# Patient Record
Sex: MALE | Race: WHITE | Employment: FULL TIME | URBAN - METROPOLITAN AREA
[De-identification: names, ages, dates, MRNs, and addresses within clinical notes are randomized per-mention and may not be internally consistent; named-entity substitution may affect disease eponyms.]

---

## 2018-10-15 ENCOUNTER — OFFICE VISIT (OUTPATIENT)
Dept: SURGERY | Age: 53
End: 2018-10-15

## 2018-10-15 VITALS
HEART RATE: 80 BPM | OXYGEN SATURATION: 98 % | TEMPERATURE: 98 F | RESPIRATION RATE: 16 BRPM | SYSTOLIC BLOOD PRESSURE: 118 MMHG | DIASTOLIC BLOOD PRESSURE: 78 MMHG | HEIGHT: 70 IN | BODY MASS INDEX: 32.21 KG/M2 | WEIGHT: 225 LBS

## 2018-10-15 DIAGNOSIS — L72.3 SEBACEOUS CYST: Primary | ICD-10-CM

## 2018-10-15 PROBLEM — I10 ESSENTIAL HYPERTENSION: Status: ACTIVE | Noted: 2018-10-15

## 2018-10-15 PROBLEM — I48.91 ATRIAL FIBRILLATION (HCC): Status: ACTIVE | Noted: 2018-10-15

## 2018-10-15 PROBLEM — E66.9 CLASS 1 OBESITY IN ADULT: Status: ACTIVE | Noted: 2018-10-15

## 2018-10-15 RX ORDER — ASPIRIN 81 MG/1
TABLET ORAL DAILY
COMMUNITY

## 2018-10-15 RX ORDER — TRIAMTERENE/HYDROCHLOROTHIAZID 37.5-25 MG
TABLET ORAL
Refills: 1 | COMMUNITY
Start: 2018-07-11

## 2018-10-15 RX ORDER — LOSARTAN POTASSIUM 50 MG/1
TABLET ORAL 2 TIMES DAILY
COMMUNITY

## 2018-10-15 RX ORDER — GEMFIBROZIL 600 MG/1
600 TABLET, FILM COATED ORAL 2 TIMES DAILY
COMMUNITY

## 2018-10-15 RX ORDER — ESOMEPRAZOLE MAGNESIUM 40 MG/1
CAPSULE, DELAYED RELEASE ORAL DAILY
COMMUNITY

## 2018-10-15 NOTE — PROGRESS NOTES
HISTORY OF PRESENT ILLNESS  Benjie Noble is a 52 y.o. male who is referred by Dr. Ha Monge for further evaluation of a sebaceous cyst on his back. HPI Comments: Mr. Louise Sharma tells me that he has had a subcutaneous mass on his back for approximately thirty years now. The mass has become progressively larger and more bothersome to him. No associated drainage. Found to have a sebaceous cyst.   He has otherwise been in his usual state of health. Past Medical History:  No date: Acid indigestion  10/15/2018: Atrial fibrillation ([address]  10/15/2018: Class 1 obesity in adult  No date: Dizziness  10/15/2018: Essential hypertension  No date: Fast heart beat  No date: Heart disease  No date: Hypertension  10/15/2018: Sebaceous cyst    History reviewed. No pertinent surgical history. Review of patient's family history indicates:    Diabetes                       Mother                    Heart Disease                  Mother                    Hypertension                   Mother                    Hypertension                   Father                    Heart Disease                  Father                    Social History: Employment - [employer]. Tobacco - Denies. EtOH - Denies. Review of systems negative except as noted. Review of Systems   Gastrointestinal: Positive for heartburn. Musculoskeletal:        Discomfort at site of sebaceous cyst.   Neurological: Positive for dizziness. Physical Exam   Constitutional: He appears well-developed and well-nourished. No distress. HENT:   Head: Normocephalic and atraumatic. Eyes: No scleral icterus. Neck: Neck supple. Cardiovascular: Normal rate and regular rhythm. Pulmonary/Chest: Effort normal and breath sounds normal.   Abdominal: Soft. He exhibits no distension. There is no tenderness. There is no rebound and no guarding. Musculoskeletal: Normal range of motion. Lymphadenopathy:     He has no cervical adenopathy. Neurological: He is alert. Skin:        Approx. 4cm x 4cm, well circumscribed, freely movable, subcutaneous mass. Clinically, this is c/w a sebaceous cyst.   Vitals reviewed. ASSESSMENT and PLAN  In view of the findings on H and P, Mr. Clarke Rodriugez should benefit from excision of the sebaceous cyst as it is bothersome to him. Discussed procedure with him including risks of bleeding, infection, recurrence. He understands and wishes to proceed. I have tentatively scheduled Mr. Clarke Rodriguez for surgery on November 1, 2018 at [facility] and will see him back in the office postoperatively. He is agreeable to this plan of action and is most certainly free to contact the office should any questions or concerns arise.          CC: Addison Charles MD

## 2018-10-15 NOTE — MR AVS SNAPSHOT
[address] [place] [address] 
[phone] Patient: Bradford Khoury MRN: [id number] :1965 Visit Information Date & Time Provider Department Dept. Phone Encounter #  
 10/15/2018 10:40 AM Silvia Augustine MD [address] [id number] Upcoming Health Maintenance Date Due Hepatitis C Screening 1965 DTaP/Tdap/Td series (1 - Tdap) 5/10/1984 Shingrix Vaccine Age 50> (1 of 2) 5/10/2013 FOBT Q 1 YEAR AGE 50-75 5/10/2013 Influenza Age 5 to Adult 2018 Allergies as of 10/15/2018  Review Complete On: 10/15/2018 By: Silvia Augustine MD  
  
 Severity Noted Reaction Type Reactions Amoxicillin  10/15/2018    Unknown (comments) Augmentin [Amoxicillin-pot Clavulanate]  10/15/2018    Unknown (comments) Current Immunizations  Never Reviewed No immunizations on file. Not reviewed this visit You Were Diagnosed With   
  
 Codes Comments Sebaceous cyst    -  Primary ICD-10-CM: L72.3 ICD-9-CM: 706. 2 Vitals BP Pulse Temp Resp Height(growth percentile) Weight(growth percentile) 118/78 (BP 1 Location: Left arm, BP Patient Position: Sitting) 80 98 °F (36.7 °C) (Oral) 16 5' 10\" (1.778 m) 225 lb (102.1 kg) SpO2 BMI Smoking Status 98% 32.28 kg/m2 Never Smoker BMI and BSA Data Body Mass Index Body Surface Area  
 32.28 kg/m 2 2.25 m 2 Your Updated Medication List  
  
   
This list is accurate as of 10/15/18 11:00 AM.  Always use your most recent med list.  
  
  
  
  
 aspirin delayed-release 81 mg tablet Take  by mouth daily. esomeprazole 40 mg capsule Commonly known as:  Pérez Sorrel Take  by mouth daily. gemfibrozil 600 mg tablet Commonly known as:  LOPID Take 600 mg by mouth two (2) times a day. losartan 50 mg tablet Commonly known as:  COZAAR Take  by mouth daily. triamterene-hydroCHLOROthiazide 37.5-25 mg per tablet Commonly known as:  Haylee Aguilar TAKE 1 TABLET BY MOUTH DAILY Introducing [facility]! [facility] introduces [employer] patient portal. Now you can access parts of your medical record, email your doctor's office, and request medication refills online. 1. In your internet browser, go to [email or web address] 2. Click on the First Time User? Click Here link in the Sign In box. You will see the New Member Sign Up page. 3. Enter your [employer] Access Code exactly as it appears below. You will not need to use this code after youve completed the sign-up process. If you do not sign up before the expiration date, you must request a new code. · [employer] Access Code: [email or web address] Expires: 1/13/2019 11:00 AM 
 
4. Enter the last four digits of your Social Security Number (xxxx) and Date of Birth (mm/dd/yyyy) as indicated and click Submit. You will be taken to the next sign-up page. 5. Create a [employer] ID. This will be your [employer] login ID and cannot be changed, so think of one that is secure and easy to remember. 6. Create a [employer] password. You can change your password at any time. 7. Enter your Password Reset Question and Answer. This can be used at a later time if you forget your password. 8. Enter your e-mail address. You will receive e-mail notification when new information is available in [address] 9. Click Sign Up. You can now view and download portions of your medical record. 10. Click the Download Summary menu link to download a portable copy of your medical information. If you have questions, please visit the Frequently Asked Questions section of the [employer] website. Remember, [employer] is NOT to be used for urgent needs. For medical emergencies, dial 911. Now available from your iPhone and Android! Please provide this summary of care documentation to your next provider. If you have any questions after today's visit, please call [phone].

## 2018-10-15 NOTE — LETTER
10/15/2018 1:54 PM 
 
Mr. Dianna Girard 
[address] [address] [facility] [address] Surgery is scheduled as follows: 
 
Surgery date: Thursday, 11/01/2018 Location: [facility], [address] Arrival time: 12:00 PM     Start time: 1:00 PM 
 
DO NOT EAT OR DRINK ANYTHING PAST MIDNIGHT THE NIGHT BEFORE SURGERY 
______________________________________________________________________ Pre-Registration: Date: Wednesday, 10/17/2018     Time: 9:00 AM 
Location: [facility], [address] PLEASE ARRIVE 15-20 MINUTES EARLY FOR REGISTRATION PAPERWORK. The nurses will review your medications with you at this time and also obtain the pre-testing that the doctor has ordered. Please allow approximately 1.5 hours for this appointment. 1st Post Operative appointment:  
 
Monday, 11/19/2018 at 11:00 AM with YUDITH Padilla [address] [address] Office Phone: [phone] For questions regarding this information please contact Kelle Ervin at [phone]. Sincerely, eKlle Ervin Surgical Scheduler Pre-Operative Instructions **DO NOT EAT or DRINK ANYTHING AFTER MIDNIGHT THE NIGHT BEFORE YOUR SURGERY** 
Please report to Patient Registration/Admitting at the time given to you by your Surgeons office  Located at the [address] [place] [address] single family member may accompany you to the pre-operative waiting area until you are called to be prepped for surgery.  Family members will be escorted to a waiting room while you are in surgery. If  your physical condition changes (fever, cold, flu), please contact your Surgeon as soon as possible. DO BRING your Insurance card and a photo ID, such as a Drivers License. Valuables are to be left at home. DO NOT wear make-up, lotion, powder, perfume/cologne/aftershave, or nail polish (unless clear). You may wear deodorant. DO NOT wear metal objects such as jewelry or hair pins. Remove all piercings/body jewelry. WEAR COMFORTABLE CLOTHING THAT WILL BE EASY TO REMOVE. If you are staying overnight, please pack a bag and leave it in your vehicle until after surgery. We recommend that you pack your toiletry items, a robe/pajamas, slippers or any other items that you need for your comfort. Have a family member deliver your bag to your room after your surgery  the Hospital does not a secure location to store these personal items. You may bring a case for glasses, contact lenses, or hearing aids. Denture cups are provided by the [address] [place] [address] OR AFTER YOUR SURGERY. YOU SHOULD NOT OPERATE A VEHICLE FOR 24 HOURS AFTER RECEIVING SEDATION OR ANESTHESIA. Please arrange for a family member or friend to drive you home after your surgery: You will not be allowed to take a cab or drive yourself home. If you are discharged the day of surgery, it is recommended that you have someone stay with you until the next morning. For questions regarding the above information, please contact the Irina Domino  office at [phone].

## 2018-10-15 NOTE — PROGRESS NOTES
1. Have you been to the ER, urgent care clinic since your last visit? Hospitalized since your last visit? No    2. Have you seen or consulted any other health care providers outside of the [address] [place] [address] since your last visit? Include any pap smears or colon screening.  No

## 2018-10-17 ENCOUNTER — HOSPITAL ENCOUNTER (OUTPATIENT)
Dept: PREADMISSION TESTING | Age: 53
Discharge: HOME | End: 2018-10-17
Payer: COMMERCIAL

## 2018-10-17 VITALS
OXYGEN SATURATION: 100 % | TEMPERATURE: 97.9 F | HEIGHT: 70 IN | WEIGHT: 226 LBS | DIASTOLIC BLOOD PRESSURE: 74 MMHG | BODY MASS INDEX: 32.35 KG/M2 | SYSTOLIC BLOOD PRESSURE: 115 MMHG | RESPIRATION RATE: 16 BRPM | HEART RATE: 66 BPM

## 2018-10-17 LAB
ERYTHROCYTE [DISTWIDTH] IN BLOOD BY AUTOMATED COUNT: 11.8 % (ref 11.5–14.5)
HCT VFR BLD AUTO: 43.7 % (ref 36.6–50.3)
HGB BLD-MCNC: 15.7 G/DL (ref 12.1–17)
MCH RBC QN AUTO: 31.8 PG (ref 26–34)
MCHC RBC AUTO-ENTMCNC: 35.9 G/DL (ref 30–36.5)
MCV RBC AUTO: 88.6 FL (ref 80–99)
NRBC # BLD: 0 K/UL (ref 0–0.01)
NRBC BLD-RTO: 0 PER 100 WBC
PLATELET # BLD AUTO: 224 K/UL (ref 150–400)
PMV BLD AUTO: 10.3 FL (ref 8.9–12.9)
RBC # BLD AUTO: 4.93 M/UL (ref 4.1–5.7)
WBC # BLD AUTO: 3.5 K/UL (ref 4.1–11.1)

## 2018-10-17 PROCEDURE — 85027 COMPLETE CBC AUTOMATED: CPT | Performed by: SURGERY

## 2018-10-17 PROCEDURE — 36415 COLL VENOUS BLD VENIPUNCTURE: CPT | Performed by: SURGERY

## 2018-10-17 RX ORDER — BUPIVACAINE HYDROCHLORIDE 2.5 MG/ML
30 INJECTION, SOLUTION EPIDURAL; INFILTRATION; INTRACAUDAL ONCE
Status: CANCELLED | OUTPATIENT
Start: 2018-10-17 | End: 2018-10-17

## 2018-10-17 NOTE — PERIOP NOTES
Connect care down during initial interview and patient verbally given time of arrival, NPO instructions, antibacterial soap instructions, and ride requirement, patient verbalized understanding and no concerned voiced

## 2018-10-26 ENCOUNTER — ANESTHESIA EVENT (OUTPATIENT)
Dept: SURGERY | Age: 53
End: 2018-10-26
Payer: COMMERCIAL

## 2018-11-01 ENCOUNTER — HOSPITAL ENCOUNTER (OUTPATIENT)
Age: 53
Setting detail: OUTPATIENT SURGERY
Discharge: HOME | End: 2018-11-01
Attending: SURGERY | Admitting: SURGERY
Payer: COMMERCIAL

## 2018-11-01 ENCOUNTER — ANESTHESIA (OUTPATIENT)
Dept: SURGERY | Age: 53
End: 2018-11-01
Payer: COMMERCIAL

## 2018-11-01 VITALS
DIASTOLIC BLOOD PRESSURE: 73 MMHG | HEART RATE: 86 BPM | RESPIRATION RATE: 17 BRPM | SYSTOLIC BLOOD PRESSURE: 116 MMHG | HEIGHT: 70 IN | OXYGEN SATURATION: 97 % | BODY MASS INDEX: 32.35 KG/M2 | WEIGHT: 225.97 LBS | TEMPERATURE: 98 F

## 2018-11-01 DIAGNOSIS — L72.3 SEBACEOUS CYST: Primary | ICD-10-CM

## 2018-11-01 PROCEDURE — 77030039266 HC ADH SKN EXOFIN S2SG -A: Performed by: SURGERY

## 2018-11-01 PROCEDURE — 77030018836 HC SOL IRR NACL ICUM -A: Performed by: SURGERY

## 2018-11-01 PROCEDURE — 74011000250 HC RX REV CODE- 250

## 2018-11-01 PROCEDURE — 76210000063 HC OR PH I REC FIRST 0.5 HR: Performed by: SURGERY

## 2018-11-01 PROCEDURE — 74011250636 HC RX REV CODE- 250/636

## 2018-11-01 PROCEDURE — 76060000033 HC ANESTHESIA 1 TO 1.5 HR: Performed by: SURGERY

## 2018-11-01 PROCEDURE — 77030011640 HC PAD GRND REM COVD -A: Performed by: SURGERY

## 2018-11-01 PROCEDURE — 76210000020 HC REC RM PH II FIRST 0.5 HR: Performed by: SURGERY

## 2018-11-01 PROCEDURE — 74011000250 HC RX REV CODE- 250: Performed by: SURGERY

## 2018-11-01 PROCEDURE — 88304 TISSUE EXAM BY PATHOLOGIST: CPT | Performed by: SURGERY

## 2018-11-01 PROCEDURE — 77030026438 HC STYL ET INTUB CARD -A: Performed by: ANESTHESIOLOGY

## 2018-11-01 PROCEDURE — 74011250636 HC RX REV CODE- 250/636: Performed by: SURGERY

## 2018-11-01 PROCEDURE — 76010000149 HC OR TIME 1 TO 1.5 HR: Performed by: SURGERY

## 2018-11-01 PROCEDURE — 74011250636 HC RX REV CODE- 250/636: Performed by: ANESTHESIOLOGY

## 2018-11-01 PROCEDURE — 77030031139 HC SUT VCRL2 J&J -A: Performed by: SURGERY

## 2018-11-01 PROCEDURE — 77030002933 HC SUT MCRYL J&J -A: Performed by: SURGERY

## 2018-11-01 PROCEDURE — 77030008684 HC TU ET CUF COVD -B: Performed by: ANESTHESIOLOGY

## 2018-11-01 PROCEDURE — 77030014008 HC SPNG HEMSTAT J&J -C: Performed by: SURGERY

## 2018-11-01 RX ORDER — FENTANYL CITRATE 50 UG/ML
INJECTION, SOLUTION INTRAMUSCULAR; INTRAVENOUS AS NEEDED
Status: DISCONTINUED | OUTPATIENT
Start: 2018-11-01 | End: 2018-11-01 | Stop reason: HOSPADM

## 2018-11-01 RX ORDER — BUPIVACAINE HYDROCHLORIDE 2.5 MG/ML
30 INJECTION, SOLUTION EPIDURAL; INFILTRATION; INTRACAUDAL ONCE
Status: COMPLETED | OUTPATIENT
Start: 2018-11-01 | End: 2018-11-01

## 2018-11-01 RX ORDER — PROPOFOL 10 MG/ML
INJECTION, EMULSION INTRAVENOUS AS NEEDED
Status: DISCONTINUED | OUTPATIENT
Start: 2018-11-01 | End: 2018-11-01 | Stop reason: HOSPADM

## 2018-11-01 RX ORDER — ONDANSETRON 2 MG/ML
INJECTION INTRAMUSCULAR; INTRAVENOUS AS NEEDED
Status: DISCONTINUED | OUTPATIENT
Start: 2018-11-01 | End: 2018-11-01 | Stop reason: HOSPADM

## 2018-11-01 RX ORDER — HYDROCODONE BITARTRATE AND ACETAMINOPHEN 5; 325 MG/1; MG/1
1 TABLET ORAL
Qty: 8 TAB | Refills: 0 | Status: SHIPPED | OUTPATIENT
Start: 2018-11-01 | End: 2021-06-29 | Stop reason: ALTCHOICE

## 2018-11-01 RX ORDER — MIDAZOLAM HYDROCHLORIDE 1 MG/ML
INJECTION, SOLUTION INTRAMUSCULAR; INTRAVENOUS AS NEEDED
Status: DISCONTINUED | OUTPATIENT
Start: 2018-11-01 | End: 2018-11-01 | Stop reason: HOSPADM

## 2018-11-01 RX ORDER — SUCCINYLCHOLINE CHLORIDE 20 MG/ML
INJECTION INTRAMUSCULAR; INTRAVENOUS AS NEEDED
Status: DISCONTINUED | OUTPATIENT
Start: 2018-11-01 | End: 2018-11-01 | Stop reason: HOSPADM

## 2018-11-01 RX ORDER — SODIUM CHLORIDE, SODIUM LACTATE, POTASSIUM CHLORIDE, CALCIUM CHLORIDE 600; 310; 30; 20 MG/100ML; MG/100ML; MG/100ML; MG/100ML
50 INJECTION, SOLUTION INTRAVENOUS CONTINUOUS
Status: DISCONTINUED | OUTPATIENT
Start: 2018-11-01 | End: 2018-11-01 | Stop reason: HOSPADM

## 2018-11-01 RX ORDER — SODIUM CHLORIDE 9 MG/ML
50 INJECTION, SOLUTION INTRAVENOUS CONTINUOUS
Status: DISCONTINUED | OUTPATIENT
Start: 2018-11-01 | End: 2018-11-01 | Stop reason: HOSPADM

## 2018-11-01 RX ORDER — LIDOCAINE HYDROCHLORIDE 20 MG/ML
INJECTION, SOLUTION EPIDURAL; INFILTRATION; INTRACAUDAL; PERINEURAL AS NEEDED
Status: DISCONTINUED | OUTPATIENT
Start: 2018-11-01 | End: 2018-11-01 | Stop reason: HOSPADM

## 2018-11-01 RX ORDER — SODIUM CHLORIDE 0.9 % (FLUSH) 0.9 %
5-10 SYRINGE (ML) INJECTION AS NEEDED
Status: DISCONTINUED | OUTPATIENT
Start: 2018-11-01 | End: 2018-11-01 | Stop reason: HOSPADM

## 2018-11-01 RX ORDER — SODIUM CHLORIDE 0.9 % (FLUSH) 0.9 %
5-10 SYRINGE (ML) INJECTION EVERY 8 HOURS
Status: DISCONTINUED | OUTPATIENT
Start: 2018-11-01 | End: 2018-11-01 | Stop reason: HOSPADM

## 2018-11-01 RX ORDER — KETOROLAC TROMETHAMINE 30 MG/ML
INJECTION, SOLUTION INTRAMUSCULAR; INTRAVENOUS AS NEEDED
Status: DISCONTINUED | OUTPATIENT
Start: 2018-11-01 | End: 2018-11-01 | Stop reason: HOSPADM

## 2018-11-01 RX ORDER — LIDOCAINE HYDROCHLORIDE 10 MG/ML
0.1 INJECTION, SOLUTION EPIDURAL; INFILTRATION; INTRACAUDAL; PERINEURAL AS NEEDED
Status: DISCONTINUED | OUTPATIENT
Start: 2018-11-01 | End: 2018-11-01 | Stop reason: HOSPADM

## 2018-11-01 RX ORDER — VANCOMYCIN HYDROCHLORIDE 1 G/20ML
INJECTION, POWDER, LYOPHILIZED, FOR SOLUTION INTRAVENOUS
Status: DISCONTINUED
Start: 2018-11-01 | End: 2018-11-01 | Stop reason: HOSPADM

## 2018-11-01 RX ORDER — FENTANYL CITRATE 50 UG/ML
25 INJECTION, SOLUTION INTRAMUSCULAR; INTRAVENOUS
Status: DISCONTINUED | OUTPATIENT
Start: 2018-11-01 | End: 2018-11-01 | Stop reason: HOSPADM

## 2018-11-01 RX ORDER — HYDROMORPHONE HYDROCHLORIDE 1 MG/ML
0.5 INJECTION, SOLUTION INTRAMUSCULAR; INTRAVENOUS; SUBCUTANEOUS
Status: DISCONTINUED | OUTPATIENT
Start: 2018-11-01 | End: 2018-11-01 | Stop reason: HOSPADM

## 2018-11-01 RX ORDER — GLYCOPYRROLATE 0.2 MG/ML
INJECTION INTRAMUSCULAR; INTRAVENOUS AS NEEDED
Status: DISCONTINUED | OUTPATIENT
Start: 2018-11-01 | End: 2018-11-01 | Stop reason: HOSPADM

## 2018-11-01 RX ORDER — DEXAMETHASONE SODIUM PHOSPHATE 4 MG/ML
INJECTION, SOLUTION INTRA-ARTICULAR; INTRALESIONAL; INTRAMUSCULAR; INTRAVENOUS; SOFT TISSUE AS NEEDED
Status: DISCONTINUED | OUTPATIENT
Start: 2018-11-01 | End: 2018-11-01 | Stop reason: HOSPADM

## 2018-11-01 RX ORDER — SODIUM CHLORIDE 900 MG/100ML
INJECTION INTRAVENOUS
Status: DISCONTINUED
Start: 2018-11-01 | End: 2018-11-01 | Stop reason: HOSPADM

## 2018-11-01 RX ADMIN — PROPOFOL 200 MG: 10 INJECTION, EMULSION INTRAVENOUS at 12:36

## 2018-11-01 RX ADMIN — VANCOMYCIN HYDROCHLORIDE 1000 MG: 1 INJECTION, POWDER, LYOPHILIZED, FOR SOLUTION INTRAVENOUS at 12:07

## 2018-11-01 RX ADMIN — KETOROLAC TROMETHAMINE 30 MG: 30 INJECTION, SOLUTION INTRAMUSCULAR; INTRAVENOUS at 13:54

## 2018-11-01 RX ADMIN — LIDOCAINE HYDROCHLORIDE 100 MG: 20 INJECTION, SOLUTION EPIDURAL; INFILTRATION; INTRACAUDAL; PERINEURAL at 12:36

## 2018-11-01 RX ADMIN — SODIUM CHLORIDE, POTASSIUM CHLORIDE, SODIUM LACTATE AND CALCIUM CHLORIDE: 600; 310; 30; 20 INJECTION, SOLUTION INTRAVENOUS at 11:52

## 2018-11-01 RX ADMIN — DEXAMETHASONE SODIUM PHOSPHATE 4 MG: 4 INJECTION, SOLUTION INTRA-ARTICULAR; INTRALESIONAL; INTRAMUSCULAR; INTRAVENOUS; SOFT TISSUE at 12:59

## 2018-11-01 RX ADMIN — GLYCOPYRROLATE 0.2 MG: 0.2 INJECTION INTRAMUSCULAR; INTRAVENOUS at 12:23

## 2018-11-01 RX ADMIN — SUCCINYLCHOLINE CHLORIDE 140 MG: 20 INJECTION INTRAMUSCULAR; INTRAVENOUS at 12:36

## 2018-11-01 RX ADMIN — ONDANSETRON 4 MG: 2 INJECTION INTRAMUSCULAR; INTRAVENOUS at 12:59

## 2018-11-01 RX ADMIN — FENTANYL CITRATE 100 MCG: 50 INJECTION, SOLUTION INTRAMUSCULAR; INTRAVENOUS at 12:36

## 2018-11-01 RX ADMIN — MIDAZOLAM HYDROCHLORIDE 2 MG: 1 INJECTION, SOLUTION INTRAMUSCULAR; INTRAVENOUS at 12:27

## 2018-11-01 NOTE — PERIOP NOTES
Patient: Yany Osborn MRN: [id number]  SSN: [id number]   YOB: 1965  Age: 52 y.o. Sex: male     Patient is status post Procedure(s) with comments:  EXCISION SEBACEOUS CYST ON BACK - Redness noted preoperatively with small amount of pus visible.     Surgeon(s) and Role:     Prashant Aj MD - Primary    Local/Dose/Irrigation:  See chart                  Peripheral IV 11/01/18 Posterior;Right Hand (Active)   Site Assessment Clean, dry, & intact 11/1/2018 12:09 PM   Phlebitis Assessment 0 11/1/2018 12:09 PM   Infiltration Assessment 0 11/1/2018 12:09 PM   Dressing Status Clean, dry, & intact 11/1/2018 12:09 PM   Dressing Type Transparent 11/1/2018 12:09 PM   Hub Color/Line Status Blue 11/1/2018 12:09 PM                           Dressing/Packing:  Wound Back Upper;Medial-DRESSING TYPE: Topical skin adhesive/glue;4 x 4;Special tape (comment)(Medipore Tape) (11/01/18 1340)  Splint/Cast:  ]    Other:  Report given by Helder Mora and Dr Pete Irby

## 2018-11-01 NOTE — ANESTHESIA PREPROCEDURE EVALUATION
Anesthetic History No history of anesthetic complications Review of Systems / Medical History Patient summary reviewed and pertinent labs reviewed Pulmonary Within defined limits Neuro/Psych Within defined limits Cardiovascular Hypertension Dysrhythmias : atrial fibrillation Exercise tolerance: >4 METS 
  
GI/Hepatic/Renal 
Within defined limits Endo/Other Morbid obesity Other Findings Physical Exam 
 
Airway Mallampati: I 
TM Distance: 4 - 6 cm Neck ROM: normal range of motion Mouth opening: Normal 
 
 Cardiovascular Rhythm: regular Rate: normal 
 
 
 
 Dental 
 
Dentition: Upper dentition intact and Lower dentition intact Pulmonary Breath sounds clear to auscultation Abdominal 
GI exam deferred Other Findings Anesthetic Plan ASA: 3 Anesthesia type: general 
 
 
 
 
Induction: Intravenous Anesthetic plan and risks discussed with: Patient

## 2018-11-01 NOTE — DISCHARGE INSTRUCTIONS
Patient Discharge Instructions    Vinod Garcia / [id number] : 1965    Admitted 2018 Discharged: 2018       · It is important that you take the medication exactly as they are prescribed. · Keep your medication in the bottles provided by the pharmacist and keep a list of the medication names, dosages, and times to be taken in your wallet. · Do not take other medications without consulting your doctor. What to do at Home    Recommended diet: Regular. Recommended activity: No Restrictions. No Driving While Taking [address]. May Take Shower or [place] Roxo after removing dressing. Can remove dressing on November 3, 2018. If you experience any of the following symptoms Fevers, Chills, Nausea, Vomitting, Redness or Drainage at Surgical Site(s) or Any Other Questions or Concerns Please Call -  [phone]. Follow-up with Dr. Stephany Canales in 10-14 days. Information obtained by :  I understand that if any problems occur once I am at home I am to contact my physician. I understand and acknowledge receipt of the instructions indicated above. Physician's or R.N.'s Signature                                                                  Date/Time                                                                                                                                              Patient or Representative Signature                                                          Date/Time     Hydrocodone/Acetaminophen (Vicodin, Norco, Lortab) - (By mouth)   Why this medicine is used:   Treats pain.   Contact a nurse or doctor right away if you have:  · Blistering, peeling, red skin rash  · Fast or slow heartbeat, shallow breathing, blue lips, fingernails, or skin  · Anxiety, restlessness, muscle spasms, twitching, seeing or hearing things that are not there  · Dark urine or pale stools, yellow skin or eyes  · Extreme weakness, sweating, seizures, cold or clammy skin  · Lightheadedness, dizziness, fainting, fever, sweating     Common side effects:  · Constipation, nausea, vomiting, loss of appetite, stomach pain  · Tiredness or sleepiness  © 2017 [address] Information is for End User's use only and may not be sold, redistributed or otherwise used for commercial purposes. How to Care for Your Wound After Its Treated With  DERMABOND* Topical Skin Adhesive  DERMABOND* Topical Skin Adhesive (2-octyl cyanoacrylate) is a sterile, liquid skin adhesive  that holds wound edges together. The film will usually remain in place for 5 to 10 days, then  naturally fall off your skin. The following will answer some of your questions and provide instructions for proper care for your  wound while it is healing:    CHECK WOUND APPEARANCE   Some swelling, redness, and pain are common with all wounds and normally will go away as the  wound heals. If swelling, redness, or pain increases or if the wound feels warm to the touch,  contact a doctor. Also contact a doctor if the wound edges reopen or separate. REPLACE BANDAGES   If your wound is bandaged, keep the bandage dry.  Replace the dressing daily until the adhesive film has fallen off or if the  bandage should become wet, unless otherwise instructed by your  physician.  When changing the dressing, do not place tape directly over the  DERMABOND adhesive film, because removing the tape later may also  remove the film. AVOID TOPICAL MEDICATIONS   Do not apply liquid or ointment medications or any other product to your wound while the  DERMABOND adhesive film is in place. These may loosen the film before your wound is healed. KEEP WOUND DRY AND PROTECTED   You may occasionally and briefly wet your wound in the shower or bath.  Do not soak or scrub  your wound, do not swim, and avoid periods of heavy perspiration until the DERMABOND  adhesive has naturally fallen off. After showering or bathing, gently blot your wound dry with a  soft towel. If a protective dressing is being used, apply a fresh, dry bandage, being sure to keep  the tape off the DERMABOND adhesive film.  Apply a clean, dry bandage over the wound if necessary to protect it.  Protect your wound from injury until the skin has had sufficient time to heal.   Do not scratch, rub, or pick at the DERMABOND adhesive film. This may loosen the film before  your wound is healed.  Protect the wound from prolonged exposure to sunlight or tanning lamps while the film is in  place. If you have any questions or concerns about this product, please consult your doctor. *Trademark ©[employer] 2002      DISCHARGE SUMMARY from Nurse    PATIENT INSTRUCTIONS:    After general anesthesia or intravenous sedation, for 24 hours or while taking prescription Narcotics:  · Limit your activities  · Do not drive and operate hazardous machinery  · Do not make important personal or business decisions  · Do  not drink alcoholic beverages  · If you have not urinated within 8 hours after discharge, please contact your surgeon on call. Report the following to your surgeon:  · Excessive pain, swelling, redness or odor of or around the surgical area  · Temperature over 100.5  · Nausea and vomiting lasting longer than 4 hours or if unable to take medications  · Any signs of decreased circulation or nerve impairment to extremity: change in color, persistent  numbness, tingling, coldness or increase pain  · Any questions      *  Please give a list of your current medications to your Primary Care Provider. *  Please update this list whenever your medications are discontinued, doses are      changed, or new medications (including over-the-counter products) are added.     *  Please carry medication information at all times in case of emergency situations. These are general instructions for a healthy lifestyle:    No smoking/ No tobacco products/ Avoid exposure to second hand smoke  Surgeon General's Warning:  Quitting smoking now greatly reduces serious risk to your health. Obesity, smoking, and sedentary lifestyle greatly increases your risk for illness    A healthy diet, regular physical exercise & weight monitoring are important for maintaining a healthy lifestyle    You may be retaining fluid if you have a history of heart failure or if you experience any of the following symptoms:  Weight gain of 3 pounds or more overnight or 5 pounds in a week, increased swelling in our hands or feet or shortness of breath while lying flat in bed. Please call your doctor as soon as you notice any of these symptoms; do not wait until your next office visit. Recognize signs and symptoms of STROKE:    F-face looks uneven    A-arms unable to move or move unevenly    S-speech slurred or non-existent    T-time-call 911 as soon as signs and symptoms begin-DO NOT go       Back to bed or wait to see if you get better-TIME IS BRAIN. Warning Signs of HEART ATTACK     Call 911 if you have these symptoms:   Chest discomfort. Most heart attacks involve discomfort in the center of the chest that lasts more than a few minutes, or that goes away and comes back. It can feel like uncomfortable pressure, squeezing, fullness, or pain.  Discomfort in other areas of the upper body. Symptoms can include pain or discomfort in one or both arms, the back, neck, jaw, or stomach.  Shortness of breath with or without chest discomfort.  Other signs may include breaking out in a cold sweat, nausea, or lightheadedness. Don't wait more than five minutes to call 911 - MINUTES MATTER! Fast action can save your life. Calling 911 is almost always the fastest way to get lifesaving treatment.  Emergency Medical Services staff can begin treatment when they arrive -- up to an hour sooner than if someone gets to the hospital by car. The discharge information has been reviewed with the patient and spouse. The patient and spouse verbalized understanding. Discharge medications reviewed with the patient and spouse and appropriate educational materials and side effects teaching were provided.   ___________________________________________________________________________________________________________________________________

## 2018-11-01 NOTE — ANESTHESIA POSTPROCEDURE EVALUATION
Procedure(s): EXCISION SEBACEOUS CYST ON BACK. Anesthesia Post Evaluation Patient location during evaluation: PACU Patient participation: complete - patient participated Level of consciousness: awake and alert Pain management: adequate Airway patency: patent Anesthetic complications: no 
Cardiovascular status: acceptable Respiratory status: acceptable Hydration status: acceptable Visit Vitals /73 Pulse 86 Temp 36.7 °C (98 °F) Resp 17 Ht 5' 10\" (1.778 m) Wt 102.5 kg (225 lb 15.5 oz) SpO2 97% BMI 32.42 kg/m²

## 2018-11-01 NOTE — H&P
Date of Surgery Update:  Agueda Watkins was seen and examined. History and physical has been reviewed. The patient has been examined. There have been no significant clinical changes since the completion of the originally dated History and Physical.  Patient identified by surgeon; surgical site was confirmed by patient and surgeon. Signed By: Meena Berrios MD     November 1, 2018 11:44 AM         Please note from the office and include the additional information below:    Past Medical History  Past Medical History:   Diagnosis Date    Acid indigestion     Atrial fibrillation ([address] 10/15/2018    Class 1 obesity in adult 10/15/2018    Dizziness     Essential hypertension 10/15/2018    Fast heart beat     Heart disease     Hypertension     Sebaceous cyst 10/15/2018        Past Surgical History  No past surgical history on file. Social History  The patient Agueda Watkins  reports that  has never smoked. he has never used smokeless tobacco. He reports that he does not drink alcohol or use drugs.      Family History  Family History   Problem Relation Age of Onset    Diabetes Mother     Heart Disease Mother     Hypertension Mother     Hypertension Father     Heart Disease Father

## 2018-11-01 NOTE — PERIOP NOTES
Discharge instructions completed with patient and wife, understanding verbalized, signature pad not functioning

## 2018-11-01 NOTE — BRIEF OP NOTE
BRIEF OPERATIVE NOTE    Date of Procedure: 11/1/2018   Preoperative Diagnosis:  Sebaceous Cyst on Back. Postoperative Diagnosis:  Same. Procedure(s):   Excise Sebaceous Cyst on Back. Surgeon(s) and Role:   Matilde Shannon MD - Primary  Surgical Staff:  Circ-1: Bobbi Rao  Scrub Tech-1: Joaquin Rainey  Event Time In Time Out   Incision Start 1305    Incision Close 1342      Anesthesia: General   Estimated Blood Loss: Approx. 20cc. Specimens:   ID Type Source Tests Collected by Time Destination   1 : Sebaceous Cyst Upper Back Preservative Back  Matilde Shannon MD 11/1/2018 1315 Pathology      Findings: Sebaceous Cyst. Approx. 4cm x 5cm. Complications: None.   Implants: * No implants in log *

## 2018-11-02 NOTE — OP NOTES
[facility]  OPERATIVE REPORT    Patrick Marrero  MR#: [id number]  : 1965  ACCOUNT #: [de-identified]   DATE OF SERVICE: 2018    PREOPERATIVE DIAGNOSIS:  Sebaceous cyst on back. POSTOPERATIVE DIAGNOSIS:  Sebaceous cyst on back. PROCEDURE PERFORMED:  Excise sebaceous cyst on back. SURGEON:  Colton Bonds MD    ANESTHESIA:  General endotracheal.    ESTIMATED BLOOD LOSS:  Approximately 20 mL. FLUIDS:  Crystalloid 800 mL. SPECIMENS REMOVED:  Sebaceous cyst to pathology. DRAINS:  None. COMPLICATIONS:  None. IMPLANTS:  None. INDICATIONS FOR SURGERY:  The patient is a 57-year-old man with a well-circumscribed, fairly movable subcutaneous mass on the superior aspect of his back. Clinically, this is consistent with a sebaceous cyst.  The patient is brought to the operating room at this time for excision of the sebaceous cyst as it is bothersome to him. The risks of the procedure including but not limited to infection, bleeding and recurrence were discussed in detail with the patient. Mr. Tyesha Aguilar understood and wished to proceed. PROCEDURE IN DETAIL:  After consent was obtained, the patient was brought to the operating room where he was intubated while in the supine position on the stretcher. Following the induction of an adequate level of general anesthesia via the endotracheal tube, compression devices were placed on both lower extremities. The patient was then placed in the prone position on the operating room table. After ensuring that all pressure points were well padded, the superior aspect of the back was prepped with Betadine and draped as a sterile field. Local anesthetic was infiltrated and an incision over the sebaceous cyst was opened sharply. Subcutaneous bleeders were carefully cauterized.   Sebaceous cyst contents were evacuated and the sebaceous cyst was dissected free circumferentially and excised piecemeal.  The sebaceous cyst measured approximately 4 x 5 cm. The wound was inspected and several bleeders cauterized. Following this, the wound was irrigated copiously with saline, inspected and found to be hemostatic. A piece of Surgicel was placed at the base of the wound. The surgical incision was then closed with 2 layers of running 2-0 Vicryl suture followed by 4-0 Monocryl subcuticular suture to the skin. Additional local anesthetic was infiltrated and the incision dressed with Dermabond. After the Dermabond dried, a pressure dressing was applied. The patient was then returned to the supine position on the stretcher. He was awakened from his general anesthetic and extubated in the operating room. The patient was transferred to the stretcher and brought to the recovery room in stable condition having tolerated the procedure well. At the conclusion of the procedure, all sponge counts, instrument counts and needle counts were reported as correct x2.       Frank Guardado MD       [facility] / [place]  D: 11/01/2018 13:58     T: 11/01/2018 22:32  JOB #: [id number]  CC: Anna Neri MD  CC: Carlita Johnson MD

## 2018-11-05 ENCOUNTER — TELEPHONE (OUTPATIENT)
Dept: SURGERY | Age: 53
End: 2018-11-05

## 2018-11-05 ENCOUNTER — OFFICE VISIT (OUTPATIENT)
Dept: SURGERY | Age: 53
End: 2018-11-05

## 2018-11-05 VITALS
HEART RATE: 84 BPM | WEIGHT: 225 LBS | TEMPERATURE: 98.9 F | BODY MASS INDEX: 32.21 KG/M2 | HEIGHT: 70 IN | OXYGEN SATURATION: 98 % | SYSTOLIC BLOOD PRESSURE: 132 MMHG | RESPIRATION RATE: 16 BRPM | DIASTOLIC BLOOD PRESSURE: 86 MMHG

## 2018-11-05 DIAGNOSIS — T81.49XA WOUND INFECTION AFTER SURGERY: Primary | ICD-10-CM

## 2018-11-05 NOTE — PROGRESS NOTES
1. Have you been to the ER, urgent care clinic since your last visit? Hospitalized since your last visit? No    2. Have you seen or consulted any other health care providers outside of the [address] [place] [address] since your last visit? Include any pap smears or colon screening.  No

## 2018-11-05 NOTE — PROGRESS NOTES
HISTORY OF PRESENT ILLNESS  Dave Gonzalez is a 52 y.o. male who returns for post operative evaluation. Mr. Kady Vang is s/p excision of a sebaceous cyst from his back on 11/1/2018. Discharged to home that day. He has noted swelling at the surgical site as well as bloody/purulent appearing drainage. Associated low grade fever. He has otherwise been in his usual state of health. Review of systems negative except as noted. Review of Systems   Constitutional: Positive for fever (Low grade. ). Negative for chills. Gastrointestinal: Negative for nausea and vomiting. Musculoskeletal:        Discomfort at surgical site. Physical Exam   Constitutional: He appears well-developed and well-nourished. No distress. Musculoskeletal: Normal range of motion. Neurological: He is alert. Skin:   The surgical incision on the back has opened at the left end. Bloody/purulent drainage is noted. Skin is erythematous and indurated. Vitals reviewed. ASSESSMENT and PLAN  In view of the findings on H and P, Mr. Kady Vang should benefit from drainage of the post-operative wound infection. Discussed procedure with him including risks of bleeding, further infection, need for further surgery. He understands and wishes to proceed. Consent on chart.      [facility] GENERAL SURGERY [address]  OFFICE PROCEDURE PROGRESS NOTE        Chart reviewed for the following:   Van Veronica MD, have reviewed the History, Physical and updated the Allergic reactions for [address] performed immediately prior to start of procedure:   Van Veronica MD, have performed the following reviews on Dave Gonzalez prior to the start of the procedure:            * Patient was identified by name and date of birth   * Agreement on procedure being performed was verified  * Risks and Benefits explained to the patient  * Procedure site verified and marked as necessary  * Patient was positioned for comfort  * Consent was signed and verified     Time: 11:40 AM.      Date of procedure: 11/5/2018    Procedure performed by:  Jimy Cheng MD    Provider assisted by: Hardeep Dodd LPN    How tolerated by patient: tolerated the procedure well with no complications    Post Procedural Pain Scale: 0 - No Hurt    Comments: None. Procedure: Following betadine prep and drape, local anesthetic was infiltrated and the surgical incision was re-opened sharply. Bloody/purulent material evacuated. Hemostasis with pressured. Wound packed with iodoform gauze. Dry dressing applied. Asked Mr. Madhav Mcneil to remove packing tomorrow and start local wound care. His wife will do wound care. Told him that he can get the open wound wet. Tylenol or Motrin for pain. Will see on 11/9/2018 or earlier if need be. Hopefully, delayed primary closure of the wound can be completed at that time. He is agreeable to this plan of action and is most certainly free to contact the office should any questions or concerns arise.      CC: Marisol Guerra MD

## 2018-11-05 NOTE — PATIENT INSTRUCTIONS
Daily wound care:    Start 11/6/18  Remove packing daily  Repack with 1 inch iodoform guaze  Cover with 4X4 and tape  You may bathe and or shower and get wound wet  Follow up in the office Friday 11/9/18 for wound check

## 2018-11-09 ENCOUNTER — OFFICE VISIT (OUTPATIENT)
Dept: SURGERY | Age: 53
End: 2018-11-09

## 2018-11-09 VITALS
BODY MASS INDEX: 32.21 KG/M2 | HEIGHT: 70 IN | HEART RATE: 67 BPM | DIASTOLIC BLOOD PRESSURE: 76 MMHG | OXYGEN SATURATION: 97 % | WEIGHT: 225 LBS | RESPIRATION RATE: 16 BRPM | SYSTOLIC BLOOD PRESSURE: 120 MMHG | TEMPERATURE: 98.2 F

## 2018-11-09 DIAGNOSIS — L72.3 SEBACEOUS CYST: Primary | ICD-10-CM

## 2018-11-09 NOTE — PROGRESS NOTES
1. Have you been to the ER, urgent care clinic since your last visit? Hospitalized since your last visit? No    2. Have you seen or consulted any other health care providers outside of the [address] [place] [address] since your last visit? Include any pap smears or colon screening.  No

## 2018-11-16 NOTE — PROGRESS NOTES
HISTORY OF PRESENT ILLNESS  Zora Vega is a 52 y.o. male who returns for a wound check. Mr. Baldomero Sandy is s/p drainage of a post-operative wound infection on 11/5/2018. Doing well since then. Pain at surgical site improved. Daily wound care has been going well. Review of systems negative except as noted. Review of Systems   Constitutional: Negative for chills and fever. Musculoskeletal:        Decreased pain at surgical site. Physical Exam   Constitutional: He appears well-developed and well-nourished. No distress. Musculoskeletal: Normal range of motion. Neurological: He is alert. Skin:   Open wound on back is clean and granulating. Erythema and induration improved. No purulent drainage. Vitals reviewed. ASSESSMENT and PLAN  Wound repacked with iodoform gauze. Dry dressing applied. Reassured Mr. Baldomero Sandy that he is doing well and that the wound is healing nicely. However, do not believe that a tension free delayed primary closure of the wound can be completed. Continue local wound care. Told him that he can get open wound wet. Will see in two to three more weeks or earlier if need be. He is agreeable to this plan and is most certainly free to contact the office should any questions or concerns arise.      CC: Jadiel Carias MD

## 2018-11-26 ENCOUNTER — OFFICE VISIT (OUTPATIENT)
Dept: SURGERY | Age: 53
End: 2018-11-26

## 2018-11-26 VITALS
WEIGHT: 225 LBS | BODY MASS INDEX: 32.21 KG/M2 | SYSTOLIC BLOOD PRESSURE: 116 MMHG | RESPIRATION RATE: 16 BRPM | OXYGEN SATURATION: 98 % | TEMPERATURE: 98.2 F | HEART RATE: 84 BPM | DIASTOLIC BLOOD PRESSURE: 70 MMHG | HEIGHT: 70 IN

## 2018-11-26 DIAGNOSIS — L72.3 SEBACEOUS CYST: Primary | ICD-10-CM

## 2018-11-26 NOTE — PROGRESS NOTES
1. Have you been to the ER, urgent care clinic since your last visit? Hospitalized since your last visit? No    2. Have you seen or consulted any other health care providers outside of the [address] [place] [address] since your last visit? Include any pap smears or colon screening.  No

## 2018-11-26 NOTE — PROGRESS NOTES
HISTORY OF PRESENT ILLNESS  Benjie Noble is a 52 y.o. male who returns for a wound check. Mr. Louise Sharma has been doing well since his last visit. No complaints today. Wound care going well. Review of systems negative except as noted. Review of Systems   Constitutional: Negative for chills and fever. Musculoskeletal:        Denies pain at site of open wound on back. Physical Exam   Constitutional: He appears well-developed and well-nourished. No distress. Musculoskeletal: Normal range of motion. Neurological: He is alert. Skin:   Open wound on back is clean and granulating. Cellulitis and induration resolved. Vitals reviewed. ASSESSMENT and PLAN  Wound repacked with iodoform gauze. Reassured Mr. Louise Sharma that he is doing well and that the wound is healing nicely. Continue wound care as before. Will see in two more weeks or earlier if need be.     CC: Ananda Mcdowell MD

## 2018-12-31 ENCOUNTER — OFFICE VISIT (OUTPATIENT)
Dept: SURGERY | Age: 53
End: 2018-12-31

## 2018-12-31 VITALS
RESPIRATION RATE: 20 BRPM | BODY MASS INDEX: 32.78 KG/M2 | DIASTOLIC BLOOD PRESSURE: 80 MMHG | WEIGHT: 229 LBS | OXYGEN SATURATION: 98 % | HEIGHT: 70 IN | SYSTOLIC BLOOD PRESSURE: 112 MMHG | HEART RATE: 72 BPM | TEMPERATURE: 97.8 F

## 2018-12-31 DIAGNOSIS — L72.3 SEBACEOUS CYST: Primary | ICD-10-CM

## 2018-12-31 NOTE — PROGRESS NOTES
HISTORY OF PRESENT ILLNESS  Ellan Felty is a 52 y.o. male who returns for a wound check. Mr. Misael Roberts has been doing well since his last visit. No complaints today. Review of systems negative except as noted. Review of Systems   Musculoskeletal:        Denies pain at surgical site. Physical Exam   Constitutional: He appears well-developed and well-nourished. No distress. Musculoskeletal: Normal range of motion. Neurological: He is alert. Skin:   Wound on back is clean and almost completely healed. There is an area of granulation tissue. Vitals reviewed. ASSESSMENT and PLAN  Granulation tissue cauterized with silver nitrate. Dry dressing applied. Reassured Mr. Misael Roberts that he is doing well and that the wound has almost completely healed. Asked him to keep a dry dressing over wound until it completely re-eptihelializes. Follow up with Dr. Noy Polanco as scheduled. Will see as needed.      CC: Monae Fuller MD

## 2021-06-29 ENCOUNTER — OFFICE VISIT (OUTPATIENT)
Dept: NEUROLOGY | Age: 56
End: 2021-06-29
Payer: COMMERCIAL

## 2021-06-29 VITALS
DIASTOLIC BLOOD PRESSURE: 80 MMHG | WEIGHT: 231 LBS | SYSTOLIC BLOOD PRESSURE: 130 MMHG | BODY MASS INDEX: 33.07 KG/M2 | HEART RATE: 73 BPM | OXYGEN SATURATION: 99 % | HEIGHT: 70 IN

## 2021-06-29 DIAGNOSIS — G47.61 PLMD (PERIODIC LIMB MOVEMENT DISORDER): ICD-10-CM

## 2021-06-29 DIAGNOSIS — G25.81 RLS (RESTLESS LEGS SYNDROME): Primary | ICD-10-CM

## 2021-06-29 PROCEDURE — 99203 OFFICE O/P NEW LOW 30 MIN: CPT | Performed by: PSYCHIATRY & NEUROLOGY

## 2021-06-29 RX ORDER — ROPINIROLE 3 MG/1
3 TABLET, FILM COATED ORAL
Qty: 90 TABLET | Refills: 1 | Status: SHIPPED | OUTPATIENT
Start: 2021-06-29 | End: 2022-07-08 | Stop reason: ALTCHOICE

## 2021-06-29 RX ORDER — GLUCOSAMINE SULFATE 1500 MG
POWDER IN PACKET (EA) ORAL DAILY
COMMUNITY

## 2021-06-29 RX ORDER — GABAPENTIN 300 MG/1
300 CAPSULE ORAL
Qty: 90 CAPSULE | Refills: 1 | Status: SHIPPED | OUTPATIENT
Start: 2021-06-29 | End: 2021-09-23 | Stop reason: SDUPTHER

## 2021-06-29 RX ORDER — ROPINIROLE 2 MG/1
3 TABLET, FILM COATED ORAL
COMMUNITY
Start: 2021-05-03 | End: 2021-06-29 | Stop reason: SDUPTHER

## 2021-06-29 RX ORDER — UREA 10 %
1000 LOTION (ML) TOPICAL DAILY
COMMUNITY

## 2021-06-29 RX ORDER — MECLIZINE HYDROCHLORIDE 25 MG/1
TABLET ORAL
COMMUNITY

## 2021-06-29 NOTE — PROGRESS NOTES
Neurology Consult Note      HISTORY PROVIDED BY: patient    Chief Complaint:   Chief Complaint   Patient presents with    New Patient     restless leg syndrome      Subjective:    Juanpablo James is a 60 y.o. right handed male who presents in consultation for RLS. Pt reports onset of sxs about a year ago. His wife told him he was moving his legs during the night while he is asleep. Then in last 4 months, when he relaxes about 7PM, his legs will twitch, goes on to describe a feeling in his legs that is hard to describe, keeps him awake, he moves his legs voluntarily, the feeling stops if he gets up or moves around. He is currently on Requip 3mg about 1 hour prior to bedtime. He works until midnight, so takes it right when he gets home. No family h/o RLS. He has hemachromatosis, so too much iron, his PCP checks this twice a year. Labs from PCP: 4/12/21: TSH 2.77, Vit D 33, CMP - Glu 130. Iron profile: normal. HgbA1C 5.5. CBC with diff. LDL 73.6    Past Medical History:   Diagnosis Date    Atrial fibrillation ([address] 10/15/2018    Class 1 obesity in adult 10/15/2018    Essential hypertension 10/15/2018    GERD (gastroesophageal reflux disease)     Meniere's disease     Takes HCTZ    Sebaceous cyst 10/15/2018      Past Surgical History:   Procedure Laterality Date    HX CYST REMOVAL  11/01/2018    Excise sebaceous cyst on back      Social History     Socioeconomic History    Marital status:      Spouse name: Not on file    Number of children: Not on file    Years of education: Not on file    Highest education level: Not on file   Occupational History    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  Lives with wife and daughter. Social Determinants of Health     Financial Resource Strain:     Difficulty of Paying Living Expenses:    Food Insecurity:     Worried About Running Out of Food in the Last Year:     [address] in the Last Year:    Transportation Needs:     Lack of Transportation (Medical):  Lack of Transportation (Non-Medical):    Physical Activity:     Days of Exercise per Week:     Minutes of Exercise per Session:    Stress:     Feeling of Stress :    Social Connections:     Frequency of Communication with Friends and Family:     Frequency of Social Gatherings with Friends and Family:     Attends Rastafarian Services:     Active Member of Clubs or Organizations:     Attends Club or Organization Meetings:     Marital Status:    Intimate Partner Violence:     Fear of Current or Ex-Partner:     Emotionally Abused:     Physically Abused:     Sexually Abused:      Family History   Problem Relation Age of Onset    Diabetes Mother     Heart Disease Mother     Hypertension Mother     Hypertension Father     Heart Disease Father     No Known Problems Brother     No Known Problems Brother     No Known Problems Brother     No Known Problems Brother     No Known Problems Daughter          Objective:   Review of Systems   HENT: Positive for hearing loss. Cardiovascular: Positive for palpitations. Neurological: Positive for dizziness. All other systems reviewed and are negative. Allergies   Allergen Reactions    Amoxicillin Rash    Augmentin [Amoxicillin-Pot Clavulanate] Rash        Meds:  Outpatient Medications Prior to Visit   Medication Sig Dispense Refill    HYDROcodone-acetaminophen (NORCO) 5-325 mg per tablet Take 1 Tab by mouth every four (4) hours as needed for Pain. Max Daily Amount: 6 Tabs. 8 Tab 0    gemfibrozil (LOPID) 600 mg tablet Take 600 mg by mouth two (2) times a day.  esomeprazole (NEXIUM) 40 mg capsule Take  by mouth daily.       losartan (COZAAR) 50 mg tablet Take  by mouth two (2) times a day.  aspirin delayed-release 81 mg tablet Take  by mouth daily.  triamterene-hydroCHLOROthiazide (MAXZIDE) 37.5-25 mg per tablet TAKE 1 TABLET BY MOUTH DAILY  1     No facility-administered medications prior to visit. Imaging:  MRI Results (most recent):  No results found for this or any previous visit. CT Results (most recent):  No results found for this or any previous visit. Reviewed records in [employer] tab today    Lab Review   Results for orders placed or performed during the hospital encounter of 10/17/18   CBC W/O DIFF   Result Value Ref Range    WBC 3.5 (L) 4.1 - 11.1 K/uL    RBC 4.93 4.10 - 5.70 M/uL    HGB 15.7 12.1 - 17.0 g/dL    HCT 43.7 36.6 - 50.3 %    MCV 88.6 80.0 - 99.0 FL    MCH 31.8 26.0 - 34.0 PG    MCHC 35.9 30.0 - 36.5 g/dL    RDW 11.8 11.5 - 14.5 %    PLATELET 253 233 - 900 K/uL    MPV 10.3 8.9 - 12.9 FL    NRBC 0.0 0  WBC    ABSOLUTE NRBC 0.00 0.00 - 0.01 K/uL        Exam:  Visit Vitals  /80   Pulse 73   Ht 5' 10\" (1.778 m)   Wt 231 lb (104.8 kg)   SpO2 99%   BMI 33.15 kg/m²     General:  Alert, cooperative, no distress. Head:  Normocephalic, without obvious abnormality, atraumatic. Respiratory:  Heart:   Non labored breathing  Regular rate and rhythm, no murmurs   Neck:   2+ carotids, no bruits   Extremities: Warm, no cyanosis or edema. Pulses: 2+ radial pulses. Neurologic:  MS: Alert and oriented x 4, speech intact. Language intact. Attention and fund of knowledge appropriate. Recent and remote memory intact.   Cranial Nerves:  II: visual fields Full to confrontation   II: pupils Equal, round, reactive to light   II: optic disc    III,VII: ptosis none   III,IV,VI: extraocular muscles  EOMI, no nystagmus or diplopia   V: facial light touch sensation  normal   VII: facial muscle function   symmetric   VIII: hearing intact   IX: soft palate elevation     XI: trapezius strength XI: sternocleidomastoid strength    XII: tongue       Motor: normal bulk and tone, no tremor              Strength: 5/5 throughout, no PD  Sensory: intact to LT, PP  Coordination: FTN and HTS intact, BJ intact  Gait: normal gait, able to tandem walk  Reflexes: 2+ symmetric, toes downgoing       Assessment/Plan   Pt is a 60 y.o. right handed male with hemachromatosis with onset of PLMS about one year ago and in the last 4 months, when he relaxes about 7PM, his legs will twitch, goes on to describe a feeling in his legs that is hard to describe, keeps him awake, he moves his legs voluntarily, the feeling stops if he gets up or moves around, c/w RLS. Exam is non-focal and unremarkable. Sxs are not well controlled on Requip 3mg in PM. Add gabapentin 300mg qhs preferably 2-3 hours prior to bedtime. F/u in clinic in 5 months, instructed to call in the interim if needed. ICD-10-CM ICD-9-CM    1. RLS (restless legs syndrome)  G25.81 333.94 gabapentin (NEURONTIN) 300 mg capsule   2. PLMD (periodic limb movement disorder)  G47.61 327.51 gabapentin (NEURONTIN) 300 mg capsule       Signed:   Francisco Hutchinson MD  6/29/2021

## 2021-06-29 NOTE — LETTER
7/11/2021    Patient: Almas Cuadra   YOB: 1965   Date of Visit: 6/29/2021     Jonathan Ortiz MD  [address] [place] [address] [phone]  Via Fax: [phone]    Dear Jonathan Ortiz MD,      Thank you for referring Mr. Almas Cuadra to [address] [place] [address] for evaluation. My notes for this consultation are attached. If you have questions, please do not hesitate to call me. I look forward to following your patient along with you.       Sincerely,    Oliver Jalloh MD

## 2021-09-22 ENCOUNTER — TELEPHONE (OUTPATIENT)
Dept: NEUROLOGY | Age: 56
End: 2021-09-22

## 2021-09-22 DIAGNOSIS — G25.81 RLS (RESTLESS LEGS SYNDROME): ICD-10-CM

## 2021-09-22 DIAGNOSIS — G47.61 PLMD (PERIODIC LIMB MOVEMENT DISORDER): ICD-10-CM

## 2021-09-22 NOTE — TELEPHONE ENCOUNTER
----- Message from Tolu Waters sent at 9/22/2021  8:56 AM EDT -----  Regarding: Dr. Veronica Dickerson Message/Vendor Calls    Caller's first and last name:QASIM BREWER      Reason for call:update       Callback required yes/no and why:yes      Best contact number(s):[phone]      Details to clarify the request:patient was told to call back in a couple of months to report if there had been any change since his last appointment and there had been no change according to the patient he is still having restless leg syndrome and periodic limb movement disorder       Tolu Waters

## 2021-09-22 NOTE — TELEPHONE ENCOUNTER
Called pt. Verified. Pt states that he feels like the gabapentin is not helping and that he is still having the restless leg and periodic limb movement.

## 2021-09-23 RX ORDER — GABAPENTIN 300 MG/1
600 CAPSULE ORAL
Qty: 180 CAPSULE | Refills: 1 | Status: SHIPPED | OUTPATIENT
Start: 2021-09-23 | End: 2021-10-06 | Stop reason: SINTOL

## 2021-09-23 NOTE — TELEPHONE ENCOUNTER
Kiya - Please have him increase the gabapentin to 600mg in the PM, if no improvement in 4 weeks, then will try Horizant.

## 2021-09-24 NOTE — TELEPHONE ENCOUNTER
Called pt. Verified. Inform pt that Dr. Vaishnavi Bailey wants him to increase the gabapentin to 600mg in the PM and if no improvement in 4 weeks, then will try Horizant. Pt verbalizes understanding.

## 2021-10-06 RX ORDER — GABAPENTIN ENACARBIL 600 MG/1
1 TABLET, EXTENDED RELEASE ORAL EVERY EVENING
Qty: 30 TABLET | Refills: 6 | Status: SHIPPED | OUTPATIENT
Start: 2021-10-06 | End: 2021-10-07 | Stop reason: SDUPTHER

## 2021-10-06 RX ORDER — GABAPENTIN ENACARBIL 600 MG/1
1 TABLET, EXTENDED RELEASE ORAL EVERY EVENING
Qty: 30 TABLET | Refills: 11 | Status: SHIPPED | OUTPATIENT
Start: 2021-10-06 | End: 2021-10-06 | Stop reason: SDUPTHER

## 2021-10-06 NOTE — TELEPHONE ENCOUNTER
Called pt. Verified. Pt states that the gabapentin is not working. He states that he is having side effects sweating, increase in appetite and extreme tiredness. This has been going on for a week. Clarify the dosage of the gabapentin pt states he's taking 600mg. Pt states that the ropinirole isn't working. Pt states that he doesn't want to take unnecessary medication if he doesn't have to. Pt states wanting to change meds now there is no improvement. Pt states that he is not going to live with these side effects.

## 2021-10-06 NOTE — TELEPHONE ENCOUNTER
Kiya - Please call pt:  1) Yes he should still be taking Ropinirole (Requip) 3mg in PM for RLS. Pts with severe RLS sometimes have to take two meds to control sxs. 2) If he is unable to tolerate the gabapentin regular release, then have him stop taking it. He can just stop it. 3) I will start Horizant 600mg taken at dinner time for best effectiveness. This is an extended release gabapentin and so it is usually better tolerated. It is FDA approved for RLS. Please fax the Rx to [id number] on [address], let the pt know that it is sent there b/c he will get a low copay, estimated to be $20, there through a program set up by the pharmaceutical company.

## 2021-10-07 RX ORDER — GABAPENTIN ENACARBIL 600 MG/1
1 TABLET, EXTENDED RELEASE ORAL EVERY EVENING
Qty: 30 TABLET | Refills: 5 | Status: SHIPPED | OUTPATIENT
Start: 2021-10-07 | End: 2022-04-21 | Stop reason: SDUPTHER

## 2021-10-07 NOTE — TELEPHONE ENCOUNTER
Called pt. Verified. Inform pt that Dr. Bharti Milner states that he should still be taking the Ropinirole 3mg in the PM for RLS and that pts with severe RLS sometimes have to take 2 meds to control sxs. Also mention that Dr. Bharti Milner states if unable to tolerate the gabapentin regular release, then have him stop taking it. Inform pt that Dr. Bharti Milner will start Horizant 600mg taken at dinner time for best effectiveness. Inform pt that the [address] was [address] and that she sent it there b/c he will get a low copay estimated $20 through a program set up by the pharmaceutical company. Pt states that he appreciate Dr. Bharti Milner assistants but would rather have it sent to [facility] thats on file. Pt states that he has the good Rx pharmacy [facility] and if it cost to much than he would get it sent to another pharmacy.

## 2021-10-07 NOTE — TELEPHONE ENCOUNTER
[address] [place] [address] and spoke w/ Arya Ramos regarding discontinued the Horizant 600mg. Called pt and inform him that Rx was sent/faxed w/ confirmation to [facility] as requested and has been canceled w/ [facility] pharmacy. Pt verbalizes understanding.

## 2021-10-07 NOTE — TELEPHONE ENCOUNTER
Modesto Dubon, please fax Rx to the [facility]. Please call [place] and cancel previous Rx. If I get any push back from his insurance company, [facility]

## 2021-10-08 ENCOUNTER — TELEPHONE (OUTPATIENT)
Dept: NEUROLOGY | Age: 56
End: 2021-10-08

## 2021-10-08 NOTE — TELEPHONE ENCOUNTER
Called [facility] pharmacy and spoke w/ [facility] and canceled the Horizant 600mg. Called pt and inform him that the medication has been faxed back to [address].

## 2021-10-15 ENCOUNTER — TELEPHONE (OUTPATIENT)
Dept: NEUROLOGY | Age: 56
End: 2021-10-15

## 2021-10-15 NOTE — TELEPHONE ENCOUNTER
Re: Margarita BLANCAS request through [facility] but advised to call [phone] to initiate over the phone. Agent Regine Drisclol reviewed and advised medication is non-formulary and a formulary exception would need to be completed but can only be faxed in. She faxed me a formulary exception form and I faxed back with progress notes. Agent advised that formularies are Gabapentin and pregabalin.

## 2021-10-19 NOTE — TELEPHONE ENCOUNTER
----- Message from Hallie Yates sent at 10/19/2021 10:39 AM EDT -----  Regarding: Dr. Miguel Martins Message/Vendor Calls    Caller's first and last name:[facility]      Reason for call:PA      Callback required yes/no and why:yes      Best contact number(s):[phone]        Details to clarify the request:[facility] pharmacy is calling because they want to know the status of a PA for the medication   gabapentin enacarbil (Horizant) 600 mg TbER [[id number]]      Hallie Yates

## 2021-10-20 NOTE — TELEPHONE ENCOUNTER
Re: Nawaf BLANCAS Denial letter dated 10/18/21. Per letter must must try and fail 2 formulary alternatives gabapentin and pregabalin, pt has tried gabapentin. Scanned letter to chart and sent update to provider. Called pharmacy and left v/m.

## 2021-10-25 ENCOUNTER — TELEPHONE (OUTPATIENT)
Dept: NEUROLOGY | Age: 56
End: 2021-10-25

## 2021-10-25 DIAGNOSIS — G47.61 PLMD (PERIODIC LIMB MOVEMENT DISORDER): ICD-10-CM

## 2021-10-25 DIAGNOSIS — G25.81 RLS (RESTLESS LEGS SYNDROME): Primary | ICD-10-CM

## 2021-10-25 NOTE — TELEPHONE ENCOUNTER
Patient calling stating the mediation Dr. Zuly Quiles prescribed is not helping and it's making him worse.  Please call

## 2021-10-25 NOTE — TELEPHONE ENCOUNTER
Called pt. Verified. Pt states that the Horizant is not helping. He states that he can't sleep and when he does sleep he gets up in 2hrs and then can't go back to sleep. Pt states that at rest his leg is still moving. Pt is requesting a different medication. Pt has also stated that he has been on the Ropinrole for a year and he feels like thats not working.

## 2021-10-28 NOTE — TELEPHONE ENCOUNTER
He has decreased Requip to 1.5mg in PM on his own. He would like to taper off of this med. Seems like RLS sxs are less severe as he is tapering it. He would like to stay on the [place] and give it a month off of Requip and see how things go. If this plan fails, then could consider starting Lyrica, recommended by his insurance company. He will call with an update.

## 2021-10-28 NOTE — TELEPHONE ENCOUNTER
----- Message from Lesia Silva sent at 10/28/2021 12:21 PM EDT -----  Regarding: Dr. Belen Goldmann Message/Vendor Calls    Caller's first and last name:patient      Reason for call:callback      Callback required yes/no and why:yes      Best contact number(s):[phone]        Details to clarify the request:patient has questions about his medication       Lesia Silva

## 2021-11-02 ENCOUNTER — TELEPHONE (OUTPATIENT)
Dept: NEUROLOGY | Age: 56
End: 2021-11-02

## 2021-11-02 NOTE — TELEPHONE ENCOUNTER
Re: Martina Maldonado fax from [facility] [place] saying that  Request was denied, spoke with the Horizant rep and there is a pharmacy called [address] [place] [address] Road that can process a cash payment script for $40.00, sent message to provider and nurse in case Dr wants pt to still take [place].

## 2021-12-14 ENCOUNTER — TELEPHONE (OUTPATIENT)
Dept: NEUROLOGY | Age: 56
End: 2021-12-14

## 2021-12-14 NOTE — TELEPHONE ENCOUNTER
Re: Robert Guzman PA fax from [facility], called FEP to see if I could request PA but agent Justine said that Jercoty Garcia is not a covered medication. She advised alternatives are pregabalin and Gabapentin. PA is required for both. Gave copy to provider to review.

## 2022-02-21 DIAGNOSIS — G47.61 PLMD (PERIODIC LIMB MOVEMENT DISORDER): ICD-10-CM

## 2022-02-21 DIAGNOSIS — G25.81 RLS (RESTLESS LEGS SYNDROME): ICD-10-CM

## 2022-02-21 RX ORDER — GABAPENTIN 300 MG/1
300 CAPSULE ORAL
Qty: 90 CAPSULE | OUTPATIENT
Start: 2022-02-21

## 2022-03-18 PROBLEM — L72.3 SEBACEOUS CYST: Status: ACTIVE | Noted: 2018-10-15

## 2022-03-19 PROBLEM — I10 ESSENTIAL HYPERTENSION: Status: ACTIVE | Noted: 2018-10-15

## 2022-03-19 PROBLEM — E66.9 CLASS 1 OBESITY IN ADULT: Status: ACTIVE | Noted: 2018-10-15

## 2022-03-19 PROBLEM — E66.811 CLASS 1 OBESITY IN ADULT: Status: ACTIVE | Noted: 2018-10-15

## 2022-03-20 PROBLEM — I48.91 ATRIAL FIBRILLATION (HCC): Status: ACTIVE | Noted: 2018-10-15

## 2022-04-19 RX ORDER — GABAPENTIN ENACARBIL 600 MG/1
TABLET, EXTENDED RELEASE ORAL
Qty: 30 TABLET | Refills: 5 | OUTPATIENT
Start: 2022-04-19

## 2022-04-19 NOTE — TELEPHONE ENCOUNTER
Pt was to f/u in 5 months, so November, 2021, he cancelled two appts in January. Needs f/u for refills, last seen in June, 2021.

## 2022-04-21 RX ORDER — GABAPENTIN ENACARBIL 600 MG/1
1 TABLET, EXTENDED RELEASE ORAL EVERY EVENING
Qty: 30 TABLET | Refills: 2 | Status: SHIPPED | OUTPATIENT
Start: 2022-04-21 | End: 2022-07-08 | Stop reason: SDUPTHER

## 2022-04-22 NOTE — TELEPHONE ENCOUNTER
I have called and left Rx for [facility] on pharmacy voicemail.
Jak - Please call [place] with refill of Horizant. I signed the Rx, but due to some glitch in [facility] care, it does not send it electronically.
Patient needs his GABAPENTIN Medication to hold him until his appointment on 7/8
no

## 2022-07-08 ENCOUNTER — OFFICE VISIT (OUTPATIENT)
Dept: NEUROLOGY | Age: 57
End: 2022-07-08
Payer: COMMERCIAL

## 2022-07-08 VITALS
BODY MASS INDEX: 32.41 KG/M2 | HEART RATE: 70 BPM | OXYGEN SATURATION: 97 % | SYSTOLIC BLOOD PRESSURE: 123 MMHG | WEIGHT: 226.4 LBS | DIASTOLIC BLOOD PRESSURE: 76 MMHG | HEIGHT: 70 IN

## 2022-07-08 DIAGNOSIS — G25.81 RLS (RESTLESS LEGS SYNDROME): Primary | ICD-10-CM

## 2022-07-08 DIAGNOSIS — G47.61 PLMD (PERIODIC LIMB MOVEMENT DISORDER): ICD-10-CM

## 2022-07-08 PROCEDURE — 99213 OFFICE O/P EST LOW 20 MIN: CPT | Performed by: PSYCHIATRY & NEUROLOGY

## 2022-07-08 RX ORDER — GABAPENTIN ENACARBIL 600 MG/1
1 TABLET, EXTENDED RELEASE ORAL EVERY EVENING
Qty: 30 TABLET | Refills: 11 | Status: SHIPPED | OUTPATIENT
Start: 2022-07-08

## 2022-07-08 RX ORDER — GABAPENTIN ENACARBIL 600 MG/1
1 TABLET, EXTENDED RELEASE ORAL EVERY EVENING
Qty: 30 TABLET | Refills: 11 | Status: SHIPPED | OUTPATIENT
Start: 2022-07-08 | End: 2022-07-08 | Stop reason: SDUPTHER

## 2022-07-08 NOTE — PROGRESS NOTES
Neurology Consult Note      HISTORY PROVIDED BY: patient    Chief Complaint:   Chief Complaint   Patient presents with    Follow-up     RLS    Neurologic Problem      Subjective:   Pt is a 59 y.o. right handed male with hemachromatosis initially and last seen on 6/29/21 with onset of PLMS about one year prior and in the previous 4 months, when he relaxes about 7PM, his legs will twitch, goes on to describe a feeling in his legs that is hard to describe, keeps him awake, he moves his legs voluntarily, the feeling stops if he gets up or moves around, c/w RLS. Exam was non-focal and unremarkable. Sxs were not well controlled on Requip 3mg in PM. Added gabapentin 300mg qhs preferably 2-3 hours prior to bedtime. He returns for delayed f/u. He called in Sept, 2021, reporting no improvement on gabapentin, dose increased to 600mg without improvement. He was switched to [facility] and felt sxs worsened. Decided to decrease his Requip since this appeared to be doing nothing, and continue Horizant. He is still taking Horizant 600mg qhs, it is working about 85% of the time and he is happy with this. It is costing him $100 a month. He has new issue of low WBC count and is seeing a hematologist in the near future, an acute on chronic problem.      Past Medical History:   Diagnosis Date    Atrial fibrillation ([address] 10/15/2018    Class 1 obesity in adult 10/15/2018    Essential hypertension 10/15/2018    GERD (gastroesophageal reflux disease)     Meniere's disease     Takes HCTZ    Sebaceous cyst 10/15/2018      Past Surgical History:   Procedure Laterality Date    HX CYST REMOVAL  11/01/2018    Excise sebaceous cyst on back      Social History     Socioeconomic History    Marital status:      Spouse name: Not on file    Number of children: Not on file    Years of education: Not on file    Highest education level: Not on file   Occupational History    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 Lives with wife and daughter. Social Determinants of Health     Financial Resource Strain:     Difficulty of Paying Living Expenses: Not on file   Food Insecurity:     Worried About Running Out of Food in the Last Year: Not on file    Lucas of Food in the Last Year: Not on file   Transportation Needs:     Lack of Transportation (Medical): Not on file    Lack of Transportation (Non-Medical):  Not on file   Physical Activity:     Days of Exercise per Week: Not on file    Minutes of Exercise per Session: Not on file   Stress:     Feeling of Stress : Not on file   Social Connections:     Frequency of Communication with Friends and Family: Not on file    Frequency of Social Gatherings with Friends and Family: Not on file    Attends Orthodoxy Services: Not on file    Active Member of [address] [place] [address] or Organizations: Not on file    Attends Club or Organization Meetings: Not on file    Marital Status: Not on file   Intimate Partner Violence:     Fear of Current or Ex-Partner: Not on file    Emotionally Abused: Not on file    Physically Abused: Not on file    Sexually Abused: Not on file   Housing Stability:     Unable to Pay for Housing in the Last Year: Not on file    Number of [place] in the Last Year: Not on file    Unstable Housing in the Last Year: Not on file     Family History   Problem Relation Age of Onset    Diabetes Mother     Heart Disease Mother     Hypertension Mother     Hypertension Father     Heart Disease Father     No Known Problems Brother     No Known Problems Brother     No Known Problems Brother     No Known Problems Brother     No Known Problems Daughter Objective:   ROS: Per HPI o/w neg    Allergies   Allergen Reactions    Amoxicillin Rash    Augmentin [Amoxicillin-Pot Clavulanate] Rash        Meds:  Outpatient Medications Prior to Visit   Medication Sig Dispense Refill    gabapentin enacarbil (Horizant) 600 mg TbER Take 1 Tablet by mouth every evening. Max Daily Amount: 1 Tablet. 30 Tablet 2    cyanocobalamin (Vitamin B-12) 100 mcg tablet Take 1,000 mcg by mouth daily.  cholecalciferol (Vitamin D3) 25 mcg (1,000 unit) cap Take  by mouth daily.  meclizine (ANTIVERT) 25 mg tablet Take  by mouth three (3) times daily as needed for Dizziness.  gemfibrozil (LOPID) 600 mg tablet Take 600 mg by mouth two (2) times a day.  esomeprazole (NEXIUM) 40 mg capsule Take  by mouth daily.  losartan (COZAAR) 50 mg tablet Take  by mouth two (2) times a day.  aspirin delayed-release 81 mg tablet Take  by mouth daily.  triamterene-hydroCHLOROthiazide (MAXZIDE) 37.5-25 mg per tablet TAKE 1 TABLET BY MOUTH DAILY  1    rOPINIRole (REQUIP) 3 mg tab tab Take 1 Tablet by mouth nightly. (Patient not taking: Reported on 7/8/2022) 90 Tablet 1     No facility-administered medications prior to visit. Imaging:  MRI Results (most recent):  No results found for this or any previous visit. CT Results (most recent):  No results found for this or any previous visit. [facility] today    Lab Review   Results for orders placed or performed in visit on 04/12/21   AMB EXT HGBA1C   Result Value Ref Range    Hemoglobin A1c, External 5.5 %        Exam:  Visit Vitals  /76   Pulse 70   Ht 5' 10\" (1.778 m)   Wt 226 lb 6.4 oz (102.7 kg)   SpO2 97%   BMI 32.49 kg/m²     General:  Alert, cooperative, no distress. Head:  Normocephalic, without obvious abnormality, atraumatic.    Respiratory:  Heart:   Non labored breathing  Regular rate and rhythm, no murmurs   Neck:   2+ carotids, no bruits   Extremities: Warm, no cyanosis or edema. Pulses: 2+ radial pulses. Neurologic:  MS: Alert and oriented x 4, speech intact. Language intact. Attention and fund of knowledge appropriate. Recent and remote memory intact. Cranial Nerves:  II: visual fields    II: pupils    II: optic disc    III,VII: ptosis none   III,IV,VI: extraocular muscles  EOMI, no nystagmus or diplopia   V: facial light touch sensation  normal   VII: facial muscle function   symmetric   VIII: hearing intact   IX: soft palate elevation     XI: trapezius strength     XI: sternocleidomastoid strength    XII: tongue       Motor: normal bulk and tone, no tremor              Strength: 5/5 throughout, no PD  Sensory: PP intact  Coordination: FTN intact  Gait: normal gait  Reflexes: 2+ symmetric       Assessment/Plan   Pt is a 59 y.o. right handed male with hemachromatosis presenting in June, 2021 with onset of PLMS about one year prior and in the previous 4 months, when he relaxes about 7PM, his legs will twitch, goes on to describe a feeling in his legs that is hard to describe, keeps him awake, he moves his legs voluntarily, the feeling stops if he gets up or moves around, c/w RLS. Failed Requip and Gabapentin. Symptoms are under good control on Horizant 600mg qPM.  Exam is non-focal and unremarkable. Continue Horizant 600mg qPM, sent to [facility] patient services per [employer]. Pt given number to call to check on Rx. F/u in clinic in 1 year, instructed to call in the interim if needed. ICD-10-CM ICD-9-CM    1. RLS (restless legs syndrome)  G25.81 333.94    2. PLMD (periodic limb movement disorder)  G47.61 327.51        Signed:   Lina Cuello MD  7/8/2022

## 2022-07-08 NOTE — LETTER
7/8/2022    Patient: Tyler Parker   YOB: 1965   Date of Visit: 7/8/2022     Helder Clancy MD  [address] [address]  Via Fax: [phone]    Dear Helder Clancy MD,      Thank you for referring Mr. Tyler Parker to [address] [place] [address] for evaluation. My notes for this consultation are attached. If you have questions, please do not hesitate to call me. I look forward to following your patient along with you.       Sincerely,    Ratna White MD

## 2022-07-08 NOTE — PROGRESS NOTES
3 bottles of Horizant 600mg samples given (total 15 tablets)    Lot: [id number] (all 3 bottles)  Exp: 01/31/2023 (all 3 bottles)

## 2022-12-15 ENCOUNTER — DOCUMENTATION ONLY (OUTPATIENT)
Dept: NEUROLOGY | Age: 57
End: 2022-12-15

## 2023-01-04 NOTE — PROGRESS NOTES
1. Have you been to the ER, urgent care clinic since your last visit? Hospitalized since your last visit? No    2. Have you seen or consulted any other health care providers outside of the [address] [place] [address] since your last visit? Include any pap smears or colon screening.  No Olanzapine Pregnancy And Lactation Text: This medication is pregnancy category C.   There are no adequate and well controlled trials with olanzapine in pregnant females.  Olanzapine should be used during pregnancy only if the potential benefit justifies the potential risk to the fetus.   In a study in lactating healthy women, olanzapine was excreted in breast milk.  It is recommended that women taking olanzapine should not breast feed.

## 2023-06-08 RX ORDER — GABAPENTIN ENACARBIL 600 MG/1
1 TABLET, EXTENDED RELEASE ORAL EVERY EVENING
Qty: 90 TABLET | Refills: 1 | Status: SHIPPED | OUTPATIENT
Start: 2023-06-08 | End: 2023-09-06

## 2023-09-19 ENCOUNTER — OFFICE VISIT (OUTPATIENT)
Age: 58
End: 2023-09-19
Payer: COMMERCIAL

## 2023-09-19 VITALS
OXYGEN SATURATION: 98 % | BODY MASS INDEX: 32.35 KG/M2 | WEIGHT: 226 LBS | HEIGHT: 70 IN | HEART RATE: 70 BPM | RESPIRATION RATE: 17 BRPM | SYSTOLIC BLOOD PRESSURE: 126 MMHG | DIASTOLIC BLOOD PRESSURE: 71 MMHG

## 2023-09-19 DIAGNOSIS — G25.81 RESTLESS LEGS SYNDROME: Primary | ICD-10-CM

## 2023-09-19 DIAGNOSIS — G47.61 PERIODIC LIMB MOVEMENT DISORDER: ICD-10-CM

## 2023-09-19 PROCEDURE — 3074F SYST BP LT 130 MM HG: CPT

## 2023-09-19 PROCEDURE — 99214 OFFICE O/P EST MOD 30 MIN: CPT

## 2023-09-19 PROCEDURE — 3078F DIAST BP <80 MM HG: CPT

## 2023-09-19 RX ORDER — GABAPENTIN ENACARBIL 600 MG/1
600 TABLET, EXTENDED RELEASE ORAL EVERY EVENING
Qty: 90 TABLET | Refills: 1 | Status: SHIPPED | OUTPATIENT
Start: 2023-09-19 | End: 2023-12-18

## 2023-09-19 ASSESSMENT — PATIENT HEALTH QUESTIONNAIRE - PHQ9
SUM OF ALL RESPONSES TO PHQ QUESTIONS 1-9: 0
SUM OF ALL RESPONSES TO PHQ QUESTIONS 1-9: 0
2. FEELING DOWN, DEPRESSED OR HOPELESS: 0
1. LITTLE INTEREST OR PLEASURE IN DOING THINGS: 0
SUM OF ALL RESPONSES TO PHQ9 QUESTIONS 1 & 2: 0
SUM OF ALL RESPONSES TO PHQ QUESTIONS 1-9: 0
SUM OF ALL RESPONSES TO PHQ QUESTIONS 1-9: 0

## 2024-02-26 DIAGNOSIS — G47.61 PERIODIC LIMB MOVEMENT DISORDER: ICD-10-CM

## 2024-02-26 DIAGNOSIS — G25.81 RESTLESS LEGS SYNDROME: ICD-10-CM

## 2024-02-26 RX ORDER — GABAPENTIN ENACARBIL 600 MG/1
600 TABLET, EXTENDED RELEASE ORAL DAILY
Qty: 90 TABLET | Refills: 1 | Status: SHIPPED | OUTPATIENT
Start: 2024-02-26 | End: 2024-08-24

## 2024-08-13 ENCOUNTER — TELEPHONE (OUTPATIENT)
Age: 59
End: 2024-08-13

## 2024-08-13 DIAGNOSIS — G47.61 PERIODIC LIMB MOVEMENT DISORDER: ICD-10-CM

## 2024-08-13 DIAGNOSIS — G25.81 RESTLESS LEGS SYNDROME: ICD-10-CM

## 2024-08-13 RX ORDER — GABAPENTIN ENACARBIL 600 MG/1
1 TABLET, EXTENDED RELEASE ORAL DAILY
Qty: 30 TABLET | Refills: 0 | Status: SHIPPED | OUTPATIENT
Start: 2024-08-13 | End: 2024-09-12

## 2024-08-13 NOTE — TELEPHONE ENCOUNTER
RE: Vesna CARD submitted via EPIC    Called the FEP plan JENNIFER SHIPLEY. This drug is not on formulary.  Awaiting fax from FEP plan complete recon.     Patient has failed Gabapentin in the past.

## 2024-09-19 ENCOUNTER — OFFICE VISIT (OUTPATIENT)
Age: 59
End: 2024-09-19
Payer: COMMERCIAL

## 2024-09-19 VITALS
OXYGEN SATURATION: 97 % | HEART RATE: 77 BPM | SYSTOLIC BLOOD PRESSURE: 126 MMHG | HEIGHT: 70 IN | WEIGHT: 224 LBS | RESPIRATION RATE: 17 BRPM | DIASTOLIC BLOOD PRESSURE: 76 MMHG | BODY MASS INDEX: 32.07 KG/M2

## 2024-09-19 DIAGNOSIS — G47.61 PERIODIC LIMB MOVEMENT DISORDER: ICD-10-CM

## 2024-09-19 DIAGNOSIS — G25.81 RESTLESS LEGS SYNDROME: ICD-10-CM

## 2024-09-19 PROCEDURE — 3078F DIAST BP <80 MM HG: CPT

## 2024-09-19 PROCEDURE — 3074F SYST BP LT 130 MM HG: CPT

## 2024-09-19 PROCEDURE — 99214 OFFICE O/P EST MOD 30 MIN: CPT

## 2024-09-19 RX ORDER — GABAPENTIN ENACARBIL 600 MG/1
TABLET, EXTENDED RELEASE ORAL
Qty: 90 TABLET | Refills: 3 | Status: SHIPPED | OUTPATIENT
Start: 2024-09-19 | End: 2025-03-20

## 2024-09-19 RX ORDER — ASCORBIC ACID 500 MG
500 TABLET ORAL DAILY
COMMUNITY

## 2024-09-19 ASSESSMENT — PATIENT HEALTH QUESTIONNAIRE - PHQ9
SUM OF ALL RESPONSES TO PHQ QUESTIONS 1-9: 0
1. LITTLE INTEREST OR PLEASURE IN DOING THINGS: NOT AT ALL
2. FEELING DOWN, DEPRESSED OR HOPELESS: NOT AT ALL
SUM OF ALL RESPONSES TO PHQ QUESTIONS 1-9: 0
SUM OF ALL RESPONSES TO PHQ9 QUESTIONS 1 & 2: 0

## 2025-02-04 ENCOUNTER — TELEPHONE (OUTPATIENT)
Age: 60
End: 2025-02-04

## 2025-02-04 NOTE — TELEPHONE ENCOUNTER
Patient would like a call back to discuss his HORIZANT medication.    Patient states within the last two weeks his restless leg syndrome has returned.    Patient states if another medication is prescribed that is not mail order he would like to use the [facility] pharmacy at [address] [place] [address] [place] [address].

## 2025-02-05 NOTE — TELEPHONE ENCOUNTER
Call placed to patient.  2 identifiers received.  Patient stated for the past 2 weeks the Horizant has not been working.  Legs are jumping.  Patient tried taking med at different times and it did not help  Please advise.  Thanks.

## 2025-02-06 NOTE — TELEPHONE ENCOUNTER
Call placed to patient.  2 identifiers received.  Advised per NP Trell that he is on the highest dose of horizant.  The only other med available would be mirapex.  Patient has already tried gabapentin and requip.  Patient wanted to know if he could take the horizant more than one time per day. Also wanted to know if he comes off the horizant does he need to wean himself off before starting mirapex.  Please advise.  Thanks.

## 2025-02-06 NOTE — TELEPHONE ENCOUNTER
Call placed to patient.  2 identifiers received.  Patient would like to come in for appointment.  Scheduled for 2/17/25 @ 8:30.

## 2025-02-17 ENCOUNTER — OFFICE VISIT (OUTPATIENT)
Age: 60
End: 2025-02-17
Payer: COMMERCIAL

## 2025-02-17 VITALS
HEIGHT: 70 IN | RESPIRATION RATE: 17 BRPM | WEIGHT: 224 LBS | BODY MASS INDEX: 32.07 KG/M2 | DIASTOLIC BLOOD PRESSURE: 82 MMHG | OXYGEN SATURATION: 98 % | SYSTOLIC BLOOD PRESSURE: 138 MMHG | HEART RATE: 76 BPM

## 2025-02-17 DIAGNOSIS — G47.09 OTHER INSOMNIA: ICD-10-CM

## 2025-02-17 DIAGNOSIS — G47.61 PERIODIC LIMB MOVEMENT DISORDER: ICD-10-CM

## 2025-02-17 DIAGNOSIS — G25.81 RESTLESS LEGS SYNDROME: Primary | ICD-10-CM

## 2025-02-17 PROCEDURE — 3075F SYST BP GE 130 - 139MM HG: CPT

## 2025-02-17 PROCEDURE — 99215 OFFICE O/P EST HI 40 MIN: CPT

## 2025-02-17 PROCEDURE — 3079F DIAST BP 80-89 MM HG: CPT

## 2025-02-17 RX ORDER — GABAPENTIN ENACARBIL 600 MG/1
TABLET, EXTENDED RELEASE ORAL
Qty: 30 TABLET | Refills: 0 | Status: SHIPPED | OUTPATIENT
Start: 2025-02-17 | End: 2025-08-18

## 2025-02-17 RX ORDER — NORTRIPTYLINE HYDROCHLORIDE 10 MG/1
10 CAPSULE ORAL NIGHTLY
Qty: 30 CAPSULE | Refills: 1 | Status: SHIPPED | OUTPATIENT
Start: 2025-02-17

## 2025-02-17 ASSESSMENT — PATIENT HEALTH QUESTIONNAIRE - PHQ9
SUM OF ALL RESPONSES TO PHQ QUESTIONS 1-9: 0
SUM OF ALL RESPONSES TO PHQ9 QUESTIONS 1 & 2: 0
SUM OF ALL RESPONSES TO PHQ QUESTIONS 1-9: 0
2. FEELING DOWN, DEPRESSED OR HOPELESS: NOT AT ALL
SUM OF ALL RESPONSES TO PHQ QUESTIONS 1-9: 0
SUM OF ALL RESPONSES TO PHQ QUESTIONS 1-9: 0
1. LITTLE INTEREST OR PLEASURE IN DOING THINGS: NOT AT ALL

## 2025-02-17 NOTE — PROGRESS NOTES
Chief Complaint   Patient presents with    Follow-up     RLS.  Patient reports the last 3 weeks Horizant has not been working      Vitals:    02/17/25 0815   BP: 138/82   Pulse: 76   Resp: 17   SpO2: 98%

## 2025-02-17 NOTE — PROGRESS NOTES
Chief Complaint   Patient presents with    Follow-up     RLS.  Patient reports the last 3 weeks Horizant has not been working       HPI    Marv is a 59 year old male here for FU. Seen last on 9/19/24 for RLS. He was using Horizant 600 mg at night and doing well on that up until recently when he reported his s/s have worsened.   He is reporting that the Last couple of weeks his RLS has been worse. Lots of twitching at night.  Not sleeping anymore, which is really impacting him.   He did switch pharmacies at the time that the pills just stopped working. Doesn't know if that has anything to do with it.             Last visit  Mr Hale is a 58 year old male here for follow up. He is a new patient for me. He was last seen by Dr Hernández on 7/8/22 for RLS. He has failed gabapentin and requip in the past. He was on Horizant 600 mg at night. He is here today and reporting great control with his current regime. He tells me this is the only medication that works. No side effects. Sleeps well.     Review of Systems   Neurological:  Positive for numbness.        RLS   Psychiatric/Behavioral:  Positive for sleep disturbance.          Past Medical History:   Diagnosis Date    Atrial fibrillation (HCC) 10/15/2018    Class 1 obesity in adult 10/15/2018    Essential hypertension 10/15/2018    GERD (gastroesophageal reflux disease)     Meniere's disease     Takes HCTZ    Sebaceous cyst 10/15/2018     Family History   Problem Relation Age of Onset    No Known Problems Brother     No Known Problems Brother     No Known Problems Brother     No Known Problems Daughter     No Known Problems Brother     Hypertension Father     Hypertension Mother     Heart Disease Mother     Diabetes Mother     Heart Disease Father      Social History     Socioeconomic History    Marital status:      Spouse name: Not on file    Number of children: Not on file    Years of education: Not on file    Highest education level: Not on file

## 2025-03-04 ENCOUNTER — ANESTHESIA EVENT (OUTPATIENT)
Facility: HOSPITAL | Age: 60
End: 2025-03-04
Payer: COMMERCIAL

## 2025-03-04 NOTE — ANESTHESIA PRE PROCEDURE
aspirin 81 MG EC tablet Take by mouth daily      vitamin D 25 MCG (1000 UT) CAPS Take by mouth daily      cyanocobalamin 100 MCG tablet Take by mouth daily      esomeprazole (NEXIUM) 40 MG delayed release capsule Take by mouth daily      gemfibrozil (LOPID) 600 MG tablet Take by mouth 2 times daily      losartan (COZAAR) 50 MG tablet Take by mouth 2 times daily      meclizine (ANTIVERT) 25 MG tablet Take by mouth 3 times daily as needed      triamterene-hydroCHLOROthiazide (MAXZIDE-25) 37.5-25 MG per tablet TAKE 1 TABLET BY MOUTH DAILY         Allergies:    Allergies   Allergen Reactions    Amoxicillin Rash    Amoxicillin-Pot Clavulanate Rash       Problem List:    Patient Active Problem List   Diagnosis Code    Sebaceous cyst L72.3    Class 1 obesity in adult E66.811    Essential hypertension I10    Atrial fibrillation (HCC) I48.91       Past Medical History:        Diagnosis Date    Atrial fibrillation (HCC) 10/15/2018    Class 1 obesity in adult 10/15/2018    Essential hypertension 10/15/2018    GERD (gastroesophageal reflux disease)     Meniere's disease     Takes HCTZ    Sebaceous cyst 10/15/2018       Past Surgical History:        Procedure Laterality Date    CYST REMOVAL  11/01/2018    Excise sebaceous cyst on back       Social History:    Social History     Tobacco Use    Smoking status: Never    Smokeless tobacco: Never   Substance Use Topics    Alcohol use: No                                Counseling given: Not Answered      Vital Signs (Current): There were no vitals filed for this visit.                                           BP Readings from Last 3 Encounters:   02/17/25 138/82   09/19/24 126/76   09/19/23 126/71       NPO Status:                                                                                 BMI:   Wt Readings from Last 3 Encounters:   02/17/25 101.6 kg (224 lb)   09/19/24 101.6 kg (224 lb)   09/19/23 102.5 kg (226 lb)     There is no height or weight on file to calculate

## 2025-03-05 ENCOUNTER — HOSPITAL ENCOUNTER (OUTPATIENT)
Facility: HOSPITAL | Age: 60
Setting detail: OUTPATIENT SURGERY
Discharge: HOME | End: 2025-03-05
Attending: INTERNAL MEDICINE | Admitting: INTERNAL MEDICINE
Payer: COMMERCIAL

## 2025-03-05 ENCOUNTER — ANESTHESIA (OUTPATIENT)
Facility: HOSPITAL | Age: 60
End: 2025-03-05
Payer: COMMERCIAL

## 2025-03-05 VITALS
BODY MASS INDEX: 33 KG/M2 | HEART RATE: 64 BPM | SYSTOLIC BLOOD PRESSURE: 138 MMHG | WEIGHT: 230 LBS | TEMPERATURE: 98 F | OXYGEN SATURATION: 97 % | DIASTOLIC BLOOD PRESSURE: 79 MMHG | RESPIRATION RATE: 17 BRPM

## 2025-03-05 PROCEDURE — 88305 TISSUE EXAM BY PATHOLOGIST: CPT

## 2025-03-05 PROCEDURE — 3600007512: Performed by: INTERNAL MEDICINE

## 2025-03-05 PROCEDURE — C1713 ANCHOR/SCREW BN/BN,TIS/BN: HCPCS | Performed by: INTERNAL MEDICINE

## 2025-03-05 PROCEDURE — 2709999900 HC NON-CHARGEABLE SUPPLY: Performed by: INTERNAL MEDICINE

## 2025-03-05 PROCEDURE — 3700000001 HC ADD 15 MINUTES (ANESTHESIA): Performed by: INTERNAL MEDICINE

## 2025-03-05 PROCEDURE — 2580000003 HC RX 258

## 2025-03-05 PROCEDURE — 7100000010 HC PHASE II RECOVERY - FIRST 15 MIN: Performed by: INTERNAL MEDICINE

## 2025-03-05 PROCEDURE — 3700000000 HC ANESTHESIA ATTENDED CARE: Performed by: INTERNAL MEDICINE

## 2025-03-05 PROCEDURE — 3600007502: Performed by: INTERNAL MEDICINE

## 2025-03-05 PROCEDURE — 7100000011 HC PHASE II RECOVERY - ADDTL 15 MIN: Performed by: INTERNAL MEDICINE

## 2025-03-05 PROCEDURE — 6360000002 HC RX W HCPCS

## 2025-03-05 RX ORDER — SODIUM CHLORIDE 0.9 % (FLUSH) 0.9 %
5-40 SYRINGE (ML) INJECTION EVERY 12 HOURS SCHEDULED
Status: CANCELLED | OUTPATIENT
Start: 2025-03-05

## 2025-03-05 RX ORDER — SODIUM CHLORIDE 9 MG/ML
INJECTION, SOLUTION INTRAVENOUS PRN
Status: CANCELLED | OUTPATIENT
Start: 2025-03-05

## 2025-03-05 RX ORDER — SODIUM CHLORIDE 9 MG/ML
INJECTION, SOLUTION INTRAVENOUS CONTINUOUS
Status: CANCELLED | OUTPATIENT
Start: 2025-03-05

## 2025-03-05 RX ORDER — SODIUM CHLORIDE 0.9 % (FLUSH) 0.9 %
5-40 SYRINGE (ML) INJECTION PRN
Status: CANCELLED | OUTPATIENT
Start: 2025-03-05

## 2025-03-05 RX ORDER — SODIUM CHLORIDE 9 MG/ML
INJECTION, SOLUTION INTRAVENOUS
Status: DISCONTINUED | OUTPATIENT
Start: 2025-03-05 | End: 2025-03-05 | Stop reason: SDUPTHER

## 2025-03-05 RX ADMIN — PROPOFOL 30 MG: 10 INJECTION, EMULSION INTRAVENOUS at 12:52

## 2025-03-05 RX ADMIN — PROPOFOL 30 MG: 10 INJECTION, EMULSION INTRAVENOUS at 12:47

## 2025-03-05 RX ADMIN — PROPOFOL 30 MG: 10 INJECTION, EMULSION INTRAVENOUS at 12:54

## 2025-03-05 RX ADMIN — PROPOFOL 30 MG: 10 INJECTION, EMULSION INTRAVENOUS at 12:41

## 2025-03-05 RX ADMIN — SODIUM CHLORIDE: 9 INJECTION, SOLUTION INTRAVENOUS at 12:30

## 2025-03-05 RX ADMIN — PROPOFOL 30 MG: 10 INJECTION, EMULSION INTRAVENOUS at 12:45

## 2025-03-05 RX ADMIN — PROPOFOL 70 MG: 10 INJECTION, EMULSION INTRAVENOUS at 12:38

## 2025-03-05 RX ADMIN — PROPOFOL 30 MG: 10 INJECTION, EMULSION INTRAVENOUS at 12:43

## 2025-03-05 RX ADMIN — PROPOFOL 30 MG: 10 INJECTION, EMULSION INTRAVENOUS at 12:49

## 2025-03-05 RX ADMIN — PROPOFOL 30 MG: 10 INJECTION, EMULSION INTRAVENOUS at 12:39

## 2025-03-05 ASSESSMENT — PAIN - FUNCTIONAL ASSESSMENT: PAIN_FUNCTIONAL_ASSESSMENT: NONE - DENIES PAIN

## 2025-03-05 NOTE — OP NOTE
SURGICAL PATHOLOGY Mary Wills MD 3/5/2025 1257    A :   Colon-Descending  Mary Wills MD 3/5/2025 1257        Complications: None.     EBL:  None.    Recommendations:   -Await pathology.  -Repeat colonoscopy in 5 years.  -High fiber diet.     -Resume normal medication(s).  -NO aspirin for 7 days     Discharge Disposition:  Home in the company of a  when able to ambulate.    Mary Wills MD  3/5/2025  12:57 PM

## 2025-03-05 NOTE — PERIOP NOTE
Initial RN admission and assessment performed and documented in Endoscopy navigator.     Patient evaluated by anesthesia in pre-procedure holding.     All procedural vital signs, airway assessment, and level of consciousness information monitored and recorded by anesthesia staff on the anesthesia record.     Report received from CRNA post procedure.  Patient transported to recovery area by RN.    Endoscopy post procedure time out was performed and specimens were verified with physician.    Endoscope was pre-cleaned at bedside immediately following procedure by Millicent.

## 2025-03-05 NOTE — H&P
heart, lungs and mental status were satisfactory for the administration of MAC sedation and for the procedure.      Mallampati score: 2     The patient was counseled at length about the risks of kenisha Covid-19 in the rafaela-operative and post-operative states including the recovery window of their procedure.  The patient was made aware that kenisha Covid-19 after a surgical procedure may worsen their prognosis for recovering from the virus and lend to a higher morbidity and or mortality risk.  The patient was given the options of postponing their procedure. All of the risks, benefits, and alternatives were discussed. The patient does wish to proceed with the procedure.      Assessment:       Plan:   Endoscopic procedure  MAC sedation

## 2025-03-05 NOTE — ANESTHESIA POSTPROCEDURE EVALUATION
Department of Anesthesiology  Postprocedure Note    Patient: Marv Callahan  MRN: [id number]  YOB: 1965  Date of evaluation: 3/5/2025    Procedure Summary       Date: 03/05/25 Room / Location: [facility] ENDO 01 / [facility] ENDOSCOPY    Anesthesia Start: 1231 Anesthesia Stop: 1256    Procedure: COLONOSCOPY (Lower GI Region) Diagnosis:       Colon cancer screening      (Colon cancer screening [Z12.11])    Surgeons: Mary Wills MD Responsible Provider: Dylan Navarrete MD    Anesthesia Type: MAC ASA Status: 2            Anesthesia Type: MAC    Quirino Phase I: Quirino Score: 10    Quirino Phase II: Quirino Score: 10    Anesthesia Post Evaluation    Patient location during evaluation: PACU  Patient participation: complete - patient participated  Level of consciousness: awake  Airway patency: patent  Nausea & Vomiting: no nausea  Cardiovascular status: hemodynamically stable  Respiratory status: acceptable  Hydration status: stable  Pain management: adequate    No notable events documented.

## 2025-03-05 NOTE — DISCHARGE INSTRUCTIONS
instructions adapted under license by your healthcare professional. If you have questions about a medical condition or this instruction, always ask your healthcare professional. [employer] disclaims any warranty or liability for your use of this information.

## 2025-03-05 NOTE — PROGRESS NOTES
Endoscopy recovery  Patient returned to baseline, vital signs stable (see vital sign flowsheet). Patient offered liquids and tolerated well. Respiratory status within defined limits. Abdomen soft not tender. Skin with in defined limits. Responsible party driving patient home was given the opportunity to ask questions. Patient discharged with documented belongings. Discharge instructions reviewed and print out given.  Patient verbalized understanding.   
No

## 2025-04-14 DIAGNOSIS — G25.81 RESTLESS LEGS SYNDROME: ICD-10-CM

## 2025-04-14 DIAGNOSIS — G47.61 PERIODIC LIMB MOVEMENT DISORDER: ICD-10-CM

## 2025-04-14 RX ORDER — GABAPENTIN ENACARBIL 600 MG/1
TABLET, EXTENDED RELEASE ORAL
Qty: 30 TABLET | Refills: 0 | Status: SHIPPED | OUTPATIENT
Start: 2025-04-14 | End: 2025-04-15 | Stop reason: SDUPTHER

## 2025-04-15 DIAGNOSIS — G47.61 PERIODIC LIMB MOVEMENT DISORDER: ICD-10-CM

## 2025-04-15 DIAGNOSIS — G25.81 RESTLESS LEGS SYNDROME: ICD-10-CM

## 2025-04-15 NOTE — TELEPHONE ENCOUNTER
The patient states that he normally gets a 90 days supply with a years worth of refills. He is requesting that this be updated.

## 2025-04-16 RX ORDER — GABAPENTIN ENACARBIL 600 MG/1
TABLET, EXTENDED RELEASE ORAL
Qty: 90 TABLET | Refills: 1 | Status: SHIPPED | OUTPATIENT
Start: 2025-04-16 | End: 2025-10-14

## (undated) DEVICE — STERILE POLYISOPRENE POWDER-FREE SURGICAL GLOVES: Brand: PROTEXIS

## (undated) DEVICE — SNARE ENDOSCP POLYP 2.4 MM 240 CM 10 MM 2.8 MM CAPTIVATOR

## (undated) DEVICE — (D)PREP SKN CHLRAPRP APPL 26ML -- CONVERT TO ITEM 371833

## (undated) DEVICE — AGENT HEMSTAT W2XL14IN OXIDIZED REGENERATED CELOS ABSRB FOR

## (undated) DEVICE — TOWEL SURG W17XL27IN STD BLU COT NONFENESTRATED PREWASHED

## (undated) DEVICE — SOLUTION IV 1000ML 0.9% SOD CHL

## (undated) DEVICE — SYR 10ML LUER LOK 1/5ML GRAD --

## (undated) DEVICE — Device

## (undated) DEVICE — SUTURE VCRL SZ 2-0 L27IN ABSRB UD L26MM SH 1/2 CIR J417H

## (undated) DEVICE — APPLICATOR BNDG 1MM ADH PREMIERPRO EXOFIN

## (undated) DEVICE — INFECTION CONTROL KIT SYS

## (undated) DEVICE — TUBING IRRIG COMPATIBLE W ERBE MEDIVATOR PMP HYDR

## (undated) DEVICE — ELECTRODE PT RET AD L9FT HI MOIST COND ADH HYDRGEL CORDED

## (undated) DEVICE — Device: Brand: SINGLE USE INJECTOR NM600/610

## (undated) DEVICE — SUPPLEMENT DIGESTIVE H2O SOL GI-EASE

## (undated) DEVICE — TRAP SURG QUAD PARABOLA SLOT DSGN SFTY SCRN TRAPEASE

## (undated) DEVICE — LIGHT HANDLE: Brand: DEVON

## (undated) DEVICE — SINGLE-USE POLYPECTOMY SNARE: Brand: CAPTIVATOR

## (undated) DEVICE — GAUZE,SPONGE,4"X4",16PLY,XRAY,STRL,LF: Brand: MEDLINE

## (undated) DEVICE — REM POLYHESIVE ADULT PATIENT RETURN ELECTRODE: Brand: VALLEYLAB

## (undated) DEVICE — SUTURE MCRYL SZ 4-0 L27IN ABSRB UD L19MM PS-2 1/2 CIR PRIM Y426H

## (undated) DEVICE — NEEDLE HYPO 25GA L1.5IN BVL ORIENTED ECLIPSE